# Patient Record
Sex: FEMALE | Race: ASIAN | NOT HISPANIC OR LATINO | Employment: OTHER | URBAN - METROPOLITAN AREA
[De-identification: names, ages, dates, MRNs, and addresses within clinical notes are randomized per-mention and may not be internally consistent; named-entity substitution may affect disease eponyms.]

---

## 2017-01-26 ENCOUNTER — AMBULATORY - HEALTHEAST (OUTPATIENT)
Dept: CARDIOLOGY | Facility: CLINIC | Age: 62
End: 2017-01-26

## 2017-03-20 ENCOUNTER — OFFICE VISIT - HEALTHEAST (OUTPATIENT)
Dept: CARDIOLOGY | Facility: CLINIC | Age: 62
End: 2017-03-20

## 2017-03-20 DIAGNOSIS — I10 ESSENTIAL HYPERTENSION WITH GOAL BLOOD PRESSURE LESS THAN 140/90: ICD-10-CM

## 2017-03-20 DIAGNOSIS — I73.9 CLAUDICATION IN PERIPHERAL VASCULAR DISEASE (H): ICD-10-CM

## 2017-03-20 DIAGNOSIS — E78.5 HYPERLIPIDEMIA LDL GOAL <100: ICD-10-CM

## 2017-03-20 DIAGNOSIS — E11.9 DIABETES (H): ICD-10-CM

## 2017-03-20 DIAGNOSIS — E66.9 OBESITY: ICD-10-CM

## 2017-03-20 LAB
CHOLEST SERPL-MCNC: 177 MG/DL
FASTING STATUS PATIENT QL REPORTED: NO
HDLC SERPL-MCNC: 47 MG/DL
LDLC SERPL CALC-MCNC: 84 MG/DL
TRIGL SERPL-MCNC: 228 MG/DL

## 2017-03-20 ASSESSMENT — MIFFLIN-ST. JEOR: SCORE: 1293.18

## 2017-03-22 ENCOUNTER — HOSPITAL ENCOUNTER (OUTPATIENT)
Dept: ULTRASOUND IMAGING | Facility: HOSPITAL | Age: 62
Discharge: HOME | End: 2017-03-22
Attending: INTERNAL MEDICINE

## 2017-03-22 DIAGNOSIS — I73.9 CLAUDICATION IN PERIPHERAL VASCULAR DISEASE (H): ICD-10-CM

## 2017-10-06 ENCOUNTER — AMBULATORY - HEALTHEAST (OUTPATIENT)
Dept: CARDIOLOGY | Facility: CLINIC | Age: 62
End: 2017-10-06

## 2017-10-06 ENCOUNTER — OFFICE VISIT - HEALTHEAST (OUTPATIENT)
Dept: CARDIOLOGY | Facility: CLINIC | Age: 62
End: 2017-10-06

## 2017-10-06 ENCOUNTER — RECORDS - HEALTHEAST (OUTPATIENT)
Dept: ADMINISTRATIVE | Facility: OTHER | Age: 62
End: 2017-10-06

## 2017-10-06 DIAGNOSIS — I48.0 AF (PAROXYSMAL ATRIAL FIBRILLATION) (H): ICD-10-CM

## 2017-10-06 ASSESSMENT — MIFFLIN-ST. JEOR: SCORE: 1279.81

## 2017-10-09 LAB
ATRIAL RATE - MUSE: 56 BPM
DIASTOLIC BLOOD PRESSURE - MUSE: NORMAL MMHG
INTERPRETATION ECG - MUSE: NORMAL
P AXIS - MUSE: 30 DEGREES
PR INTERVAL - MUSE: 164 MS
QRS DURATION - MUSE: 86 MS
QT - MUSE: 402 MS
QTC - MUSE: 387 MS
R AXIS - MUSE: 23 DEGREES
SYSTOLIC BLOOD PRESSURE - MUSE: NORMAL MMHG
T AXIS - MUSE: 24 DEGREES
VENTRICULAR RATE- MUSE: 56 BPM

## 2017-10-12 ENCOUNTER — HOSPITAL ENCOUNTER (OUTPATIENT)
Dept: CARDIOLOGY | Facility: HOSPITAL | Age: 62
Discharge: HOME | End: 2017-10-12
Attending: INTERNAL MEDICINE

## 2017-10-12 LAB
AORTIC ROOT: 2.8 CM
BSA FOR ECHO PROCEDURE: 1.85 M2
CV BLOOD PRESSURE: NORMAL MMHG
CV ECHO HEIGHT: 59 IN
CV ECHO WEIGHT: 182 LBS
DOP CALC LVOT AREA: 3.14 CM2
DOP CALC LVOT DIAMETER: 2 CM
DOP CALC LVOT STROKE VOLUME: 55.9 CM3
DOP CALCLVOT PEAK VEL VTI: 17.8 CM
EJECTION FRACTION: 64 % (ref 55–75)
FRACTIONAL SHORTENING: 53.1 % (ref 28–44)
INTERVENTRICULAR SEPTUM IN END DIASTOLE: 0.9 CM (ref 0.6–0.9)
IVS/PW RATIO: 0.7
LA AREA 1: 17.5 CM2
LA AREA 2: 11.9 CM2
LEFT ATRIUM LENGTH: 4.89 CM
LEFT ATRIUM SIZE: 4 CM
LEFT ATRIUM TO AORTIC ROOT RATIO: 1.43 NO UNITS
LEFT ATRIUM VOLUME INDEX: 19.6 ML/M2
LEFT ATRIUM VOLUME: 36.2 CM3
LEFT VENTRICLE CARDIAC INDEX: 1.8 L/MIN/M2
LEFT VENTRICLE CARDIAC OUTPUT: 3.4 L/MIN
LEFT VENTRICLE DIASTOLIC VOLUME INDEX: 24.3 CM3/M2 (ref 34–74)
LEFT VENTRICLE DIASTOLIC VOLUME: 45 CM3 (ref 46–106)
LEFT VENTRICLE HEART RATE: 60 BPM
LEFT VENTRICLE MASS INDEX: 108.4 G/M2
LEFT VENTRICLE SYSTOLIC VOLUME INDEX: 8.6 CM3/M2 (ref 11–31)
LEFT VENTRICLE SYSTOLIC VOLUME: 16 CM3 (ref 14–42)
LEFT VENTRICULAR INTERNAL DIMENSION IN DIASTOLE: 4.9 CM (ref 3.8–5.2)
LEFT VENTRICULAR INTERNAL DIMENSION IN SYSTOLE: 2.3 CM (ref 2.2–3.5)
LEFT VENTRICULAR MASS: 200.5 G
LEFT VENTRICULAR OUTFLOW TRACT MEAN GRADIENT: 2 MMHG
LEFT VENTRICULAR OUTFLOW TRACT MEAN VELOCITY: 55.4 CM/S
LEFT VENTRICULAR POSTERIOR WALL IN END DIASTOLE: 1.3 CM (ref 0.6–0.9)
LV STROKE VOLUME INDEX: 30.2 ML/M2
MITRAL VALVE E/A RATIO: 1.4
MV AVERAGE E/E' RATIO: 9 CM/S
MV DECELERATION TIME: 180 MS
MV E'TISSUE VEL-LAT: 8.09 CM/S
MV E'TISSUE VEL-MED: 5.65 CM/S
MV LATERAL E/E' RATIO: 7.7
MV MEDIAL E/E' RATIO: 11
MV PEAK A VELOCITY: 43.3 CM/S
MV PEAK E VELOCITY: 62 CM/S
NUC REST DIASTOLIC VOLUME INDEX: 2912 LBS
NUC REST SYSTOLIC VOLUME INDEX: 59 IN
PR MAX PG: 5 MMHG
PR PEAK VELOCITY: 119 CM/S

## 2017-10-12 ASSESSMENT — MIFFLIN-ST. JEOR: SCORE: 1276.18

## 2017-10-17 ENCOUNTER — COMMUNICATION - HEALTHEAST (OUTPATIENT)
Dept: CARDIOLOGY | Facility: CLINIC | Age: 62
End: 2017-10-17

## 2017-10-19 ENCOUNTER — COMMUNICATION - HEALTHEAST (OUTPATIENT)
Dept: CARDIOLOGY | Facility: CLINIC | Age: 62
End: 2017-10-19

## 2018-05-16 ENCOUNTER — HOSPITAL ENCOUNTER (OUTPATIENT)
Dept: CT IMAGING | Facility: HOSPITAL | Age: 63
Discharge: HOME | End: 2018-05-16
Attending: INTERPRETER

## 2018-05-16 ENCOUNTER — RECORDS - HEALTHEAST (OUTPATIENT)
Dept: ADMINISTRATIVE | Facility: OTHER | Age: 63
End: 2018-05-16

## 2018-05-16 DIAGNOSIS — M54.50 LOW BACK PAIN: ICD-10-CM

## 2018-06-15 ENCOUNTER — COMMUNICATION - HEALTHEAST (OUTPATIENT)
Dept: CARDIOLOGY | Facility: CLINIC | Age: 63
End: 2018-06-15

## 2018-06-15 DIAGNOSIS — I48.0 AF (PAROXYSMAL ATRIAL FIBRILLATION) (H): ICD-10-CM

## 2018-09-27 ENCOUNTER — COMMUNICATION - HEALTHEAST (OUTPATIENT)
Dept: ADMINISTRATIVE | Facility: CLINIC | Age: 63
End: 2018-09-27

## 2018-10-24 ENCOUNTER — COMMUNICATION - HEALTHEAST (OUTPATIENT)
Dept: ADMINISTRATIVE | Facility: CLINIC | Age: 63
End: 2018-10-24

## 2019-01-03 ENCOUNTER — COMMUNICATION - HEALTHEAST (OUTPATIENT)
Dept: CARDIOLOGY | Facility: CLINIC | Age: 64
End: 2019-01-03

## 2019-01-03 DIAGNOSIS — I48.0 AF (PAROXYSMAL ATRIAL FIBRILLATION) (H): ICD-10-CM

## 2019-01-18 ENCOUNTER — OFFICE VISIT (OUTPATIENT)
Dept: OPHTHALMOLOGY | Facility: CLINIC | Age: 64
End: 2019-01-18
Payer: MEDICARE

## 2019-01-18 DIAGNOSIS — H02.826 CYST OF LEFT EYELID: ICD-10-CM

## 2019-01-18 DIAGNOSIS — H44.002 INFECTION OF LEFT EYE: Primary | ICD-10-CM

## 2019-01-18 PROCEDURE — 92002 INTRM OPH EXAM NEW PATIENT: CPT | Performed by: OPHTHALMOLOGY

## 2019-01-18 ASSESSMENT — VISUAL ACUITY
METHOD: SNELLEN - LINEAR
OS_SC: 20/25
OD_SC: 20/25-

## 2019-01-18 NOTE — LETTER
"    1/18/2019         RE: Martha Hubbard  [address] [place] [id number]        Dear Colleague,    Thank you for referring your patient, Martha Hubbard, to the [facility]. Please see a copy of my visit note below.     Current Eye Medications:  AT's As needed, Ketotifen bid o     Subjective:  MD check  Patient was seen 12/24 in ER and was advised to see ophthalmologist for \"eye infection left eye.  She had a hard time getting an appointment that she could make.  Patient is using At's as needed (several times daily) and Ketotifen Bid both eyes.  She complains of a lump on the upper nasal eyelid that is tender to touch. No visual complaints.     Objective:  See Ophthalmology Exam.       Assessment:  Recent preseptal cellulitis likely from infected cyst.  Does not appear to be chalazion or dacryocyst; Sebaceous?      Plan:  Use warm packs on the eyes for about ten minutes twice daily.  Start Maxitrol ointment - place small squirt onto inner corner of the left eye.  Return visit in 2 months for MD check.     Jack Edwards M.D.  [phone]        Again, thank you for allowing me to participate in the care of your patient.        Sincerely,        Jack Edwards MD    "

## 2019-01-18 NOTE — PROGRESS NOTES
" Current Eye Medications:  AT's As needed, Ketotifen bid o     Subjective:  MD check  Patient was seen 12/24 in ER and was advised to see ophthalmologist for \"eye infection left eye.  She had a hard time getting an appointment that she could make.  Patient is using At's as needed (several times daily) and Ketotifen Bid both eyes.  She complains of a lump on the upper nasal eyelid that is tender to touch. No visual complaints.     Objective:  See Ophthalmology Exam.       Assessment:  Recent preseptal cellulitis likely from infected cyst.  Does not appear to be chalazion or dacryocyst; Sebaceous?      Plan:  Use warm packs on the eyes for about ten minutes twice daily.  Start Maxitrol ointment - place small squirt onto inner corner of the left eye.  Return visit in 2 months for MD check.     Jack Edwards M.D.  [phone]      "

## 2019-01-18 NOTE — PATIENT INSTRUCTIONS
Use warm packs on the eyes for about ten minutes twice daily.  Start Maxitrol ointment - place small squirt onto inner corner of the left eye.  Return visit in 2 months for MD check.     Jack Edwards M.D.  [phone]

## 2019-01-19 ASSESSMENT — SLIT LAMP EXAM - LIDS
COMMENTS: NORMAL
COMMENTS: NORMAL

## 2019-01-19 ASSESSMENT — EXTERNAL EXAM - RIGHT EYE: OD_EXAM: NORMAL

## 2019-02-27 ENCOUNTER — AMBULATORY - HEALTHEAST (OUTPATIENT)
Dept: CARDIOLOGY | Facility: CLINIC | Age: 64
End: 2019-02-27

## 2019-02-27 ENCOUNTER — RECORDS - HEALTHEAST (OUTPATIENT)
Dept: ADMINISTRATIVE | Facility: OTHER | Age: 64
End: 2019-02-27

## 2019-05-01 ENCOUNTER — OFFICE VISIT - HEALTHEAST (OUTPATIENT)
Dept: CARDIOLOGY | Facility: CLINIC | Age: 64
End: 2019-05-01

## 2019-05-01 DIAGNOSIS — R00.2 PALPITATIONS: ICD-10-CM

## 2019-05-01 ASSESSMENT — MIFFLIN-ST. JEOR: SCORE: 1289.78

## 2019-05-08 ENCOUNTER — HOSPITAL ENCOUNTER (OUTPATIENT)
Dept: CARDIOLOGY | Facility: HOSPITAL | Age: 64
Discharge: HOME | End: 2019-05-08
Attending: INTERNAL MEDICINE

## 2019-05-08 DIAGNOSIS — R00.2 PALPITATIONS: ICD-10-CM

## 2019-05-24 ENCOUNTER — AMBULATORY - HEALTHEAST (OUTPATIENT)
Dept: CARDIOLOGY | Facility: CLINIC | Age: 64
End: 2019-05-24

## 2019-05-24 ENCOUNTER — COMMUNICATION - HEALTHEAST (OUTPATIENT)
Dept: CARDIOLOGY | Facility: CLINIC | Age: 64
End: 2019-05-24

## 2019-05-28 ENCOUNTER — OFFICE VISIT (OUTPATIENT)
Dept: OPHTHALMOLOGY | Facility: CLINIC | Age: 64
End: 2019-05-28
Payer: MEDICARE

## 2019-05-28 DIAGNOSIS — H44.002 INFECTION OF LEFT EYE: Primary | ICD-10-CM

## 2019-05-28 DIAGNOSIS — H02.826 CYST OF LEFT EYELID: ICD-10-CM

## 2019-05-28 PROCEDURE — 92012 INTRM OPH EXAM EST PATIENT: CPT | Performed by: OPHTHALMOLOGY

## 2019-05-28 ASSESSMENT — SLIT LAMP EXAM - LIDS
COMMENTS: NORMAL
COMMENTS: NORMAL

## 2019-05-28 ASSESSMENT — VISUAL ACUITY
METHOD: SNELLEN - LINEAR
OS_SC+: -2
OD_SC+: -3
OS_SC: 20/20
OD_SC: 20/25

## 2019-05-28 ASSESSMENT — EXTERNAL EXAM - RIGHT EYE: OD_EXAM: NORMAL

## 2019-05-28 NOTE — LETTER
5/28/2019         RE: Martha Hubbard  [address]  [place] [address]        Dear Colleague,    Thank you for referring your patient, Martha Hubbard, to the [facility]. Please see a copy of my visit note below.     Current Eye Medications:  None.       Subjective:  Follow up preseptal cellulitis left eye. Patient is here for a MD Check.  No changes (the same size as her last visit) in the bump near her medial canthus area of her left eye.  She discontinued the ointment, because it did not appear to be helping.  The bump is red, but is not tender to touch.     No itching; just red and somewhat swollen.      Objective:  See Ophthalmology Exam.       Assessment:  Left upper lid cyst improving.      Plan:  Start Tobradex ointment left eye twice daily.  Use artificial tears up to 4 times daily both eyes.  (Refresh Tears, Systane Ultra/Balance, or Theratears)  Use warm packs on the eyes for about 10 minutes twice daily as needed.  Return visit in 2 months for MD check.   (Could get AM opinion)    Jack Edwards M.D.  [phone]         Again, thank you for allowing me to participate in the care of your patient.        Sincerely,        Jack Edwards MD

## 2019-05-28 NOTE — PROGRESS NOTES
Current Eye Medications:  None.       Subjective:  Follow up preseptal cellulitis left eye. Patient is here for a MD Check.  No changes (the same size as her last visit) in the bump near her medial canthus area of her left eye.  She discontinued the ointment, because it did not appear to be helping.  The bump is red, but is not tender to touch.     No itching; just red and somewhat swollen.      Objective:  See Ophthalmology Exam.       Assessment:  Left upper lid cyst improving.      Plan:  Start Tobradex ointment left eye twice daily.  Use artificial tears up to 4 times daily both eyes.  (Refresh Tears, Systane Ultra/Balance, or Theratears)  Use warm packs on the eyes for about 10 minutes twice daily as needed.  Return visit in 2 months for MD check.   (Could get AM opinion)    Jack Edwards M.D.  [phone]

## 2019-05-28 NOTE — PATIENT INSTRUCTIONS
Start Tobradex ointment left eye twice daily.  Use artificial tears up to 4 times daily both eyes.  (Refresh Tears, Systane Ultra/Balance, or Theratears)  Use warm packs on the eyes for about 10 minutes twice daily as needed.  Return visit in 2 months for MD check.     Jack Edwards M.D.  [phone]

## 2019-05-29 ENCOUNTER — TELEPHONE (OUTPATIENT)
Dept: OPHTHALMOLOGY | Facility: CLINIC | Age: 64
End: 2019-05-29

## 2019-05-29 NOTE — TELEPHONE ENCOUNTER
Patient was prescribed Tobradex ointment. Is not covered by patient's insurance.  Sent over Blephamide, but that was not covered either.  Started prior authorization for the tobradex ointment for patient and will wait to hear from [facility] regarding this.

## 2019-05-30 NOTE — TELEPHONE ENCOUNTER
Received fax from [employer] for authorization of the Tobradex ointment, good until 12-31-19.  Called pharmacy and they will fill the Tobradex ointment for the patient.  They have to order the medication and will call her when it comes in.

## 2019-05-31 ENCOUNTER — DOCUMENTATION ONLY (OUTPATIENT)
Dept: OPHTHALMOLOGY | Facility: CLINIC | Age: 64
End: 2019-05-31

## 2020-01-27 ENCOUNTER — AMBULATORY - HEALTHEAST (OUTPATIENT)
Dept: CARDIOLOGY | Facility: CLINIC | Age: 65
End: 2020-01-27

## 2020-01-27 ENCOUNTER — RECORDS - HEALTHEAST (OUTPATIENT)
Dept: ADMINISTRATIVE | Facility: OTHER | Age: 65
End: 2020-01-27

## 2020-03-02 ENCOUNTER — OFFICE VISIT - HEALTHEAST (OUTPATIENT)
Dept: CARDIOLOGY | Facility: CLINIC | Age: 65
End: 2020-03-02

## 2020-03-02 DIAGNOSIS — I10 ESSENTIAL HYPERTENSION: ICD-10-CM

## 2020-03-02 DIAGNOSIS — I48.20 CHRONIC ATRIAL FIBRILLATION (H): ICD-10-CM

## 2020-03-02 DIAGNOSIS — E78.5 HYPERLIPIDEMIA LDL GOAL <100: ICD-10-CM

## 2021-03-22 ENCOUNTER — RECORDS - HEALTHEAST (OUTPATIENT)
Dept: ADMINISTRATIVE | Facility: OTHER | Age: 66
End: 2021-03-22

## 2021-03-24 ENCOUNTER — HOSPITAL ENCOUNTER (OUTPATIENT)
Dept: RADIOLOGY | Facility: HOSPITAL | Age: 66
Discharge: HOME | End: 2021-03-24
Attending: FAMILY MEDICINE

## 2021-03-24 DIAGNOSIS — M71.20 SYNOVIAL CYST OF POPLITEAL SPACE: ICD-10-CM

## 2021-05-05 ENCOUNTER — RECORDS - HEALTHEAST (OUTPATIENT)
Dept: ADMINISTRATIVE | Facility: OTHER | Age: 66
End: 2021-05-05

## 2021-05-05 ENCOUNTER — RECORDS - HEALTHEAST (OUTPATIENT)
Dept: CARDIOLOGY | Facility: CLINIC | Age: 66
End: 2021-05-05

## 2021-05-07 ENCOUNTER — OFFICE VISIT - HEALTHEAST (OUTPATIENT)
Dept: CARDIOLOGY | Facility: CLINIC | Age: 66
End: 2021-05-07

## 2021-05-07 DIAGNOSIS — R06.09 DOE (DYSPNEA ON EXERTION): ICD-10-CM

## 2021-05-07 DIAGNOSIS — E66.01 MORBID OBESITY (H): ICD-10-CM

## 2021-05-07 DIAGNOSIS — R07.9 EXERTIONAL CHEST PAIN: ICD-10-CM

## 2021-05-13 ENCOUNTER — RECORDS - HEALTHEAST (OUTPATIENT)
Dept: ADMINISTRATIVE | Facility: OTHER | Age: 66
End: 2021-05-13

## 2021-05-14 ENCOUNTER — HOSPITAL ENCOUNTER (OUTPATIENT)
Dept: NUCLEAR MEDICINE | Facility: HOSPITAL | Age: 66
Discharge: HOME | End: 2021-05-14
Attending: INTERNAL MEDICINE
Payer: MEDICARE

## 2021-05-14 ENCOUNTER — HOSPITAL ENCOUNTER (OUTPATIENT)
Dept: CARDIOLOGY | Facility: HOSPITAL | Age: 66
Discharge: HOME | End: 2021-05-14
Attending: INTERNAL MEDICINE
Payer: MEDICARE

## 2021-05-14 DIAGNOSIS — R06.09 DOE (DYSPNEA ON EXERTION): ICD-10-CM

## 2021-05-14 DIAGNOSIS — R07.9 EXERTIONAL CHEST PAIN: ICD-10-CM

## 2021-05-14 LAB
CV STRESS CURRENT BP HE: NORMAL
CV STRESS CURRENT HR HE: 107
CV STRESS CURRENT HR HE: 107
CV STRESS CURRENT HR HE: 118
CV STRESS CURRENT HR HE: 119
CV STRESS CURRENT HR HE: 128
CV STRESS CURRENT HR HE: 131
CV STRESS CURRENT HR HE: 131
CV STRESS CURRENT HR HE: 135
CV STRESS CURRENT HR HE: 136
CV STRESS CURRENT HR HE: 136
CV STRESS CURRENT HR HE: 53
CV STRESS CURRENT HR HE: 56
CV STRESS CURRENT HR HE: 67
CV STRESS CURRENT HR HE: 68
CV STRESS CURRENT HR HE: 69
CV STRESS CURRENT HR HE: 69
CV STRESS CURRENT HR HE: 70
CV STRESS CURRENT HR HE: 73
CV STRESS CURRENT HR HE: 74
CV STRESS CURRENT HR HE: 83
CV STRESS CURRENT HR HE: 89
CV STRESS CURRENT HR HE: 91
CV STRESS DEVIATION TIME HE: NORMAL
CV STRESS ECHO PERCENT HR HE: NORMAL
CV STRESS EXERCISE STAGE HE: NORMAL
CV STRESS EXERCISE STAGE REACHED HE: NORMAL
CV STRESS FINAL RESTING BP HE: NORMAL
CV STRESS FINAL RESTING HR HE: 69
CV STRESS MAX HR HE: 136
CV STRESS MAX TREADMILL GRADE HE: 12
CV STRESS MAX TREADMILL SPEED HE: 2.5
CV STRESS PEAK DIA BP HE: NORMAL
CV STRESS PEAK SYS BP HE: NORMAL
CV STRESS PHASE HE: NORMAL
CV STRESS PROTOCOL HE: NORMAL
CV STRESS RESTING PT POSITION HE: NORMAL
CV STRESS RESTING PT POSITION HE: NORMAL
CV STRESS ST DEVIATION AMOUNT HE: NORMAL
CV STRESS ST DEVIATION ELEVATION HE: NORMAL
CV STRESS ST EVELATION AMOUNT HE: NORMAL
CV STRESS TEST TYPE HE: NORMAL
CV STRESS TOTAL STAGE TIME MIN 1 HE: NORMAL
NUC STRESS EJECTION FRACTION: 66 %
RATE PRESSURE PRODUCT: NORMAL
STRESS ECHO BASELINE HR: 53
STRESS ECHO CALCULATED PERCENT HR: 88 %
STRESS ECHO LAST STRESS DIASTOLIC BP: 72
STRESS ECHO LAST STRESS HR: 136
STRESS ECHO LAST STRESS SYSTOLIC BP: 146
STRESS ECHO POST ESTIMATED WORKLOAD: 7.1
STRESS ECHO POST EXERCISE DUR MIN: 5
STRESS ECHO POST EXERCISE DUR SEC: 15
STRESS ECHO TARGET HR: 155

## 2021-05-26 ENCOUNTER — COMMUNICATION - HEALTHEAST (OUTPATIENT)
Dept: CARDIOLOGY | Facility: CLINIC | Age: 66
End: 2021-05-26

## 2021-05-26 DIAGNOSIS — R07.9 EXERTIONAL CHEST PAIN: ICD-10-CM

## 2021-05-26 DIAGNOSIS — R06.09 DOE (DYSPNEA ON EXERTION): ICD-10-CM

## 2021-05-27 VITALS
HEART RATE: 72 BPM | DIASTOLIC BLOOD PRESSURE: 68 MMHG | RESPIRATION RATE: 16 BRPM | SYSTOLIC BLOOD PRESSURE: 110 MMHG | WEIGHT: 191.2 LBS

## 2021-05-28 NOTE — PATIENT INSTRUCTIONS - HE
- Take an extra dose of metoprolol 25 mg when your heart is racing    - Walk for 30 minutes everyday    - Wear a monitor for 24 hours so we can see how your heart rate is doing    - See me in 6 months

## 2021-05-28 NOTE — PROGRESS NOTES
Thank you for asking the [facility] Care team to see Martha Hubbard in consultation  to evaluate atrial fibrillation.      Assessment/Plan:   Paroxysmal atrial fibrillation - rhythm is regular on exam today. She continues on toprol XL 25mg and xarelto. Will set up for a holter monitor to look for rate control.    Obesity - diet and exercise discussed     Hyperlipidemia -  on atorvastatin 20mg. Consider increasing to 40mg on her f/u call.     F/U 6 months     Current History:   Martha Hubbard is a 64 y.o. obese woman with paroxysmal atrial fibrillation, diabetes, hypertension, hyperlipidemia, GERD and obesity. An exercise nuclear stress test was negative for ischemia or infarct 7/2016. ABIs were normal 3/2017.  Her atrial fibrillation was diagnosed 10/2017 and metoprolol and xarelto were started and an echo was essentially normal. 9/2018 she presented in afib with RVR to the ER and no medications changes were made. TSH was normal 2/2019.      Martha returns to clinic today, she was last seen in March 2017, prior to her diagnosis of atrial fibrillation. She notes palpitations a couple of times per year and that they last between 1-6 hours. There is no associated chest discomfort, dyspnea, or dizziness. She denies any syncope. Martha Hubbard does not exercise due to arthralgias. She denies PND or orthopnea or edema.     Past Medical History:     Past Medical History:   Diagnosis Date     Atrial fibrillation (H)      Claudication in peripheral vascular disease (H)      Diabetes mellitus (H)      Hyperlipidemia      Hypertension      Obesity        Past Surgical History:   History reviewed. No pertinent surgical history.    Family History:   History reviewed. No pertinent family history.    Social History:    reports that she has never smoked. She has never used smokeless tobacco. She reports that she does not drink alcohol or use drugs.    Meds:     Current Outpatient Medications   Medication Sig     amLODIPine (NORVASC) 5 MG  "tablet Take 5 mg by mouth daily.     artifi.tears,hypromellose,,PF, 0.3 % Drop Apply 1 drop to eye as needed (Dry Eye).     atorvastatin (LIPITOR) 20 MG tablet Take 20 mg by mouth daily.      cholecalciferol, vitamin D3, (CHOLECALCIFEROL) 1,000 unit tablet Take 2,000 Units by mouth daily.     ibuprofen (ADVIL,MOTRIN) 600 MG tablet Take 600 mg by mouth every 8 (eight) hours as needed for pain.     metoprolol succinate (TOPROL-XL) 25 MG Take 1 tablet (25 mg total) by mouth daily.     nitroglycerin (NITROSTAT) 0.4 MG SL tablet Place 1 tablet (0.4 mg total) under the tongue every 5 (five) minutes as needed for chest pain.     omeprazole (PRILOSEC) 40 MG capsule Take 40 mg by mouth daily before breakfast. Take 30 minutes prior to first meal of the day     XARELTO 20 mg Tab TAKE 1 TABLET(20 MG) BY MOUTH DAILY       Allergies:   Patient has no known allergies.    Review of Systems:   Review of Systems:   General: Weight Gain  Eyes: WNL  Ears/Nose/Throat: WNL  Lungs: Snoring  Heart: Arm Pain  Stomach: WNL  Bladder: Frequent Urination at Night  Muscle/Joints: Joint Pain  Skin: WNL  Nervous System: WNL  Mental Health: Depression     Blood: Easy Bruising       Objective:      Physical Exam  @LASTENCWT:3@  5' 1\" (1.549 m)  @BMI:3@  /80 (Patient Site: Left Arm, Patient Position: Sitting, Cuff Size: Adult Large)   Pulse 64   Resp 16   Ht 5' 1\" (1.549 m)   Wt 178 lb (80.7 kg)   BMI 33.63 kg/m      General Appearance:   Alert, cooperative and in no acute distress.   HEENT:  No scleral icterus; the mucous membranes were pink and moist.   Neck: JVP flat. No thyromegaly. No HJR   Chest: The spine was straight. The chest was symmetric.   Lungs:   Respirations unlabored; the lungs are clear to auscultation.   Cardiovascular:   S1 and S2 normal and without murmur. No clicks or rubs. No carotid bruits noted. Right DP, PT, and radial pulses 2+. Left DP, PT, and radial pulses 2+.   Abdomen:  No organomegaly, masses, bruits, or " tenderness. Bowels sounds are present   Extremities: No cyanosis, clubbing, or edema.   Skin: No xanthelasma.   Neurologic: Mood and affect are appropriate.         Lab Review   Lab Results   Component Value Date     09/16/2018     10/05/2017    K 3.9 09/16/2018    K 3.9 10/05/2017     09/16/2018     10/05/2017    CO2 25 09/16/2018    CO2 24 10/05/2017    BUN 18 09/16/2018    BUN 18 10/05/2017    CREATININE 1.02 09/16/2018    CREATININE 0.90 10/05/2017    CALCIUM 10.0 09/16/2018    CALCIUM 10.2 10/05/2017     Lab Results   Component Value Date    WBC 10.7 09/16/2018    WBC 8.7 10/05/2017    HGB 14.9 09/16/2018    HGB 14.9 10/05/2017    HCT 43.8 09/16/2018    HCT 45.3 10/05/2017    MCV 92 09/16/2018    MCV 94 10/05/2017     09/16/2018     10/05/2017     Lab Results   Component Value Date    CHOL 177 03/20/2017    TRIG 228 (H) 03/20/2017    HDL 47 (L) 03/20/2017     No results found for: BNP      Nia Gonzalez M.D.

## 2021-05-30 VITALS — BODY MASS INDEX: 36.12 KG/M2 | HEIGHT: 60 IN | WEIGHT: 184 LBS

## 2021-05-31 VITALS — BODY MASS INDEX: 36.85 KG/M2 | HEIGHT: 59 IN | WEIGHT: 182.8 LBS

## 2021-05-31 VITALS — BODY MASS INDEX: 36.69 KG/M2 | WEIGHT: 182 LBS | HEIGHT: 59 IN

## 2021-06-02 VITALS — HEIGHT: 61 IN | BODY MASS INDEX: 33.61 KG/M2 | WEIGHT: 178 LBS

## 2021-06-04 VITALS
SYSTOLIC BLOOD PRESSURE: 104 MMHG | HEART RATE: 70 BPM | TEMPERATURE: 98 F | BODY MASS INDEX: 34.39 KG/M2 | WEIGHT: 182 LBS | DIASTOLIC BLOOD PRESSURE: 78 MMHG | OXYGEN SATURATION: 97 % | RESPIRATION RATE: 14 BRPM

## 2021-06-06 NOTE — PATIENT INSTRUCTIONS - HE
- Stay on same medications: Metoprolol is for atrial fibrillation, amlodipine is for blood pressure and xarelto is a blood thinner to reduce stroke     - Walk 30 minutes every day    - See me in 1 year

## 2021-06-09 ENCOUNTER — RECORDS - HEALTHEAST (OUTPATIENT)
Dept: ADMINISTRATIVE | Facility: OTHER | Age: 66
End: 2021-06-09

## 2021-06-09 NOTE — PROGRESS NOTES
Thank you for asking the [facility] Heart Care team to see Martha Hubbard.      Assessment/Plan:     Multiple cardiovascular risk factors. HTN and DM controlled. Will check lipids today.     Regarding her claudication will send for exercise ABIs with duplex and segmental pressures.     Encouraged walking 30 minutes daily x 5 days per week and continuing to cut back on processed carbohydrates.    F/U 1 year       Current History:   Martha Hubbard is a 62 y.o. with diabetes, hypertension, hyperlipidemia, GERD and obesity who I met last summer for episodes of atypical chest tightening for 1 year. An exercise nuclear stress test was negative for ischemia or infarct. She also has occasional strong heart beats but no prolonged palpitations.    Martha returns for f/u today and notes no further chest pains. She had the flu this winter and has not gotten back into her regular exercise regimen of 15 minutes on the treadmill, 3 days per week. She continues to c/o pain in the calves and feet if she walks too fast for too long. She denies rest pain in the legs or feet. The right is worse than the left and also has some right buttock pain with ambulation. The pain in the calves starts in the feet and extends proximally.    She tried to cut down on her rice intake and gained weight. She feels that she is unable to lose weight.        Past Medical History:     Past Medical History:   Diagnosis Date     Claudication in peripheral vascular disease      Diabetes mellitus      Hyperlipidemia      Hypertension      Obesity        Past Surgical History:   History reviewed. No pertinent surgical history.    Family History:   History reviewed. No pertinent family history.    Social History:    reports that she has never smoked. She does not have any smokeless tobacco history on file. She reports that she does not drink alcohol or use illicit drugs.    Meds:     Current Outpatient Prescriptions   Medication Sig Note     amLODIPine (NORVASC) 10 MG tablet  "TK 1 T PO QD 7/27/2016: Received from: External Pharmacy     atorvastatin (LIPITOR) 20 MG tablet Take 20 mg by mouth bedtime.      omeprazole (PRILOSEC) 40 MG capsule TK ONE C PO  30 MINS PRIOR TO FIRST MEAL OF THE DAY 7/27/2016: Received from: External Pharmacy     aspirin 81 MG EC tablet TK 1 T PO QD 7/27/2016: Received from: External Pharmacy     nitroglycerin (NITROSTAT) 0.4 MG SL tablet Place 1 tablet (0.4 mg total) under the tongue every 5 (five) minutes as needed for chest pain.        Allergies:   Review of patient's allergies indicates no known allergies.    Review of Systems:   Review of Systems:   General: WNL  Eyes: WNL  Ears/Nose/Throat: WNL  Lungs: WNL  Heart: WNL  Stomach: WNL  Bladder: WNL  Muscle/Joints: WNL  Skin: WNL  Nervous System: WNL  Mental Health: WNL     Blood: WNL       Objective:      Physical Exam  @LASTENCWT:3@  4' 11.5\" (1.511 m)  @BMI:3@  Visit Vitals     /82 (Patient Site: Right Arm, Patient Position: Sitting, Cuff Size: Adult Large)     Pulse 74     Resp 18     Ht 4' 11.5\" (1.511 m)     Wt 184 lb (83.5 kg)     SpO2 98%     BMI 36.54 kg/m2       General Appearance:   Alert, cooperative and in no acute distress.   HEENT:  No scleral icterus; the mucous membranes were pink and moist.   Neck: JVP flat. No thyromegaly. No HJR   Chest: The spine was straight. The chest was symmetric.   Lungs:   Respirations unlabored; the lungs are clear to auscultation.   Cardiovascular:   S1 and S2 normal and without murmur. No clicks or rubs. No carotid bruits noted. Bilateral radials 2+. Right CFA, pop 2+, pedal pulses 1+. Left CFA 1+, pop 2+, PT 2+, DP 1+. No bruits neck, abdomen, or groin.   Abdomen:  No organomegaly, masses, bruits, or tenderness. Bowels sounds are present   Extremities: No cyanosis, clubbing, or edema.   Skin: No xanthelasma.   Neurologic: Mood and affect are appropriate.         Lab Review   No results found for: NA, K, CL, CO2, BUN, CREATININE, GLUCOSE, CALCIUM  No results " found for: WBC, HGB, HCT, MCV, PLT  No results found for: CHOL, TRIG, HDL, LDL, LDLDIRECT  No results found for: BNP      Nia Gonzalez M.D.

## 2021-06-16 PROBLEM — I48.20 CHRONIC ATRIAL FIBRILLATION (H): Status: ACTIVE | Noted: 2020-03-02

## 2021-06-16 PROBLEM — E66.01 MORBID OBESITY (H): Status: ACTIVE | Noted: 2021-05-07

## 2021-06-22 ENCOUNTER — HOSPITAL ENCOUNTER (OUTPATIENT)
Dept: RADIOLOGY | Facility: HOSPITAL | Age: 66
Discharge: HOME | End: 2021-06-22
Payer: MEDICARE

## 2021-06-22 DIAGNOSIS — M25.559 PAIN IN HIP: ICD-10-CM

## 2021-06-25 NOTE — TELEPHONE ENCOUNTER
----- Message from Nia Gonzalez MD sent at 5/18/2021  3:45 PM CDT -----  Holter does not show any slow or fast heart rates and there is no atrial fibrillation. Let's have her hold her metoprolol and only use it as a pill in the pocket when she is having palpitations. I also want her to go for a 30 minute walk everyday to get herself back in shape. I would like to see her in a month to see if she is feeling better.     Thanks, EG

## 2021-06-25 NOTE — PROGRESS NOTES
Progress Notes by Lexi Self MD at 10/6/2017  3:50 PM     Author: Lexi Self MD Service: -- Author Type: Physician    Filed: 10/9/2017  2:11 PM Encounter Date: 10/6/2017 Status: Signed    : Lexi Self MD (Physician)           Click to link to [facility] HEART CARE NOTE    Thank you, Dr. Garcia, for asking us to see Martha Hubbard at the [facility].      Assessment/Recommendations   Assessment:    1.  Atrial fibrillation: Paroxysmal atrial fibrillation with a recent episode that was prolonged.  She did convert spontaneously.  No associated chest pain or breathing difficulty.  She has been started on metoprolol since yesterday.  We will proceed with a 30 day event monitor to further evaluate her arrhythmia and decide if we need to adjust her medications further.  Check echocardiogram to evaluate for structural heart disease.  2.  Anticoagulation: [id number] = 3 and recommend anticoagulation for stroke risk reduction.  Started her on Xarelto 20 mg daily.  3. Follow-up 1-2 months.       History of Present Illness    Ms. Martha Hubbard is a 62 y.o. female with history of diabetes mellitus type 2, dyslipidemia and hypertension who I am seeing today in rapid access clinic for newly diagnosed atrial fibrillation.  She reports that she has had 5-6 episodes of palpitations over the past 6 months or so.  She had one episode that was prolonged and unremitting yesterday.  She came into the ED was found to be in atrial fibrillation.  She is given medication as slowed and her heart rate and she eventually converted on her own to normal rhythm.  Twelve-lead EKG today shows sinus bradycardia 56 bpm and is otherwise normal.  She denies any problems with chest pain or breathing difficulty.  Last year she underwent an exercise nuclear stress test that was negative for inducible ischemia.  She also had preserved left ventricular systolic function.        Physical Examination Review of Systems   Vitals:    10/06/17 1556   BP: 102/72   Pulse: 72   Resp: 16     Body mass index is 36.92 kg/(m^2).  Wt Readings from Last 3 Encounters:   10/06/17 182 lb 12.8 oz (82.9 kg)   10/05/17 180 lb (81.6 kg)   03/20/17 184 lb (83.5 kg)       General Appearance:   alert, no apparent distress   HEENT:  no scleral icterus; the mucous membranes are pink and moist                                  Neck: jugular venous pressure normal   Chest: the spine is straight and the chest is symmetric   Lungs:   respirations unlabored; the lungs are clear to auscultation   Cardiovascular:   regular rhythm with normal first and second heart sounds and no murmurs or gallops   Abdomen:  no organomegaly, masses, bruits, or tenderness; bowel sounds are present   Extremities: no cyanosis, clubbing, or edema   Skin: no xanthelasma    General: Weight Gain  Eyes: WNL  Ears/Nose/Throat: WNL  Lungs: Snoring  Heart: Irregular Heartbeat  Stomach: Heartburn  Bladder: WNL  Muscle/Joints: Joint Pain  Skin: WNL  Nervous System: WNL  Mental Health: Depression     Blood: WNL     Medical History  Surgical History Family History Social History   Past Medical History:   Diagnosis Date   ? Claudication in peripheral vascular disease    ? Diabetes mellitus    ? Hyperlipidemia    ? Hypertension    ? Obesity     No past surgical history on file.  Social History     Social History   ? Marital status:      Spouse name: N/A   ? Number of children: N/A   ? Years of education: N/A     Occupational History   ? Not on file.     Social History Main Topics   ? Smoking status: Never Smoker   ? Smokeless tobacco: Not on file   ? Alcohol use No   ? Drug use: No   ? Sexual activity: Not on file     Other Topics Concern   ? Not on file     Social History Narrative          Medications  Allergies   Current Outpatient Prescriptions   Medication Sig Dispense Refill   ? amLODIPine (NORVASC) 5 MG tablet Take 5 mg by mouth daily.      ? artifi.tears,hypromellose,,PF, 0.3 % Drop Apply 1 drop to eye as needed (Dry Eye).     ? atorvastatin (LIPITOR) 20 MG tablet Take 20 mg by mouth daily.      ? cholecalciferol, vitamin D3, (CHOLECALCIFEROL) 1,000 unit tablet Take 2,000 Units by mouth daily.     ? ibuprofen (ADVIL,MOTRIN) 600 MG tablet Take 600 mg by mouth every 8 (eight) hours as needed for pain.     ? metoprolol succinate (TOPROL-XL) 25 MG Take 1 tablet (25 mg total) by mouth daily. 20 tablet 0   ? nitroglycerin (NITROSTAT) 0.4 MG SL tablet Place 1 tablet (0.4 mg total) under the tongue every 5 (five) minutes as needed for chest pain. 25 tablet 1   ? omeprazole (PRILOSEC) 40 MG capsule Take 40 mg by mouth daily before breakfast. Take 30 minutes prior to first meal of the day     ? rivaroxaban (XARELTO) 20 mg Tab Take 1 tablet (20 mg total) by mouth daily. 30 tablet 5     No current facility-administered medications for this visit.       No Known Allergies      Lab Results    Chemistry/lipid CBC Cardiac Enzymes/BNP/TSH/INR   Lab Results   Component Value Date    CHOL 177 03/20/2017    HDL 47 (L) 03/20/2017    LDLCALC 84 03/20/2017    TRIG 228 (H) 03/20/2017    CREATININE 0.90 10/05/2017    BUN 18 10/05/2017    K 3.9 10/05/2017     10/05/2017     10/05/2017    CO2 24 10/05/2017    Lab Results   Component Value Date    WBC 8.7 10/05/2017    HGB 14.9 10/05/2017    HCT 45.3 10/05/2017    MCV 94 10/05/2017     10/05/2017    Lab Results   Component Value Date    TROPONINI <0.01 10/05/2017    TSH 1.13 10/05/2017    INR 0.90 10/05/2017

## 2021-06-25 NOTE — TELEPHONE ENCOUNTER
With the help of [facility] , call placed to patient and discussion of results and recommendations. Pt verbalized understanding. Will continue on blood thinner at this time, will stop Metoprolol and take only if needed, once daily for palpitations. Pt will continue to monitor and has follow-up with Cardiology arranged for 7/5/21. Of note patient still has ongoing LOPEZ/SOB, she reports that happens when talking. She finds it hard to talk in a full sentence sometimes. Encouraged to stop the medication as directed, and monitor. If becomes worse to call the clinic. [facility]  gave clinic phone number to call. Keep upcoming OV with EMG in July, call sooner if concerns. Opportunity to ask questions, none further. Pt gave verbal read back to  of heart care clinic phone number. [email or web address]

## 2021-06-28 NOTE — PROGRESS NOTES
Progress Notes by Nia Gonzalez MD at 3/2/2020  4:10 PM     Author: Nia Gonzalez MD Service: -- Author Type: Physician    Filed: 3/2/2020  4:50 PM Encounter Date: 3/2/2020 Status: Signed    : Nia Gonzalez MD (Physician)         Thank you, Dr. Garcia, for asking the [facility] Heart Care team to see Ms. Martha Hubbard to evaluate heart disease.      Assessment/Recommendations   Assessment/Plan:    Atrial fibrillation, paroxysmal - rhythm regular on exam. On metoprolol and xarelto. Asymptomatic.    HTN - controlled    Obesity - diet and exercise    F/U 1 year       History of Present Illness/Subjective    Ms. Martha Hubbard is a 65 y.o. female obese woman with paroxysmal atrial fibrillation, diabetes, hypertension, hyperlipidemia, GERD and obesity. An exercise nuclear stress test was negative for ischemia or infarct 7/2016. ABIs were normal 3/2017.  Her atrial fibrillation was diagnosed 10/2017 and metoprolol and xarelto were started and an echo was essentially normal. 9/2018 she presented in afib with RVR to the ER and no medications changes were made. TSH was normal 2/2019.      Martha Hubbard returns for f/u today. She denies palpitations, dyspnea, chest pain. She was in a local ER about a week ago for fever and cough. No influenza studies were done. She has had no fever for a week and denies a cough, but was heard coughing when walking in the [place]. She denies any sick contacts or recent travel.          Physical Examination Review of Systems   Vitals:    03/02/20 1623   BP: 104/78   Pulse: 70   Resp: 14   SpO2: 97%     Body mass index is 34.39 kg/m .  Wt Readings from Last 3 Encounters:   03/02/20 182 lb (82.6 kg)   05/01/19 178 lb (80.7 kg)   09/16/18 175 lb (79.4 kg)     [unfilled]  General Appearance:   no distress, normal body habitus   ENT/Mouth: membranes moist, no oral lesions or bleeding gums.      EYES:  no scleral icterus, normal conjunctivae   Neck: no carotid bruits or thyromegaly   Chest/Lungs:    lungs are clear to auscultation, no rales or wheezing, no sternal scar, equal chest wall expansion    Cardiovascular:   Regular. Normal first and second heart sounds with no murmurs, rubs, or gallops. The right radial, dorsalis pedis and posterior tibial pulses are intact.  The left radial, dorsalis pedis and posterior tibial pulses are intact. Jugular venous pressure flat, no edema bilaterally    Abdomen:  no organomegaly, masses, bruits, or tenderness; bowel sounds are present   Extremities: no cyanosis or clubbing   Skin: no xanthelasma, warm.    Neurologic: normal  bilateral, no tremors     Psychiatric: alert and oriented x3, calm     General: Fever, Night Sweats  Eyes: WNL  Ears/Nose/Throat: WNL  Lungs: WNL  Heart: WNL  Stomach: Nausea  Bladder: WNL  Muscle/Joints: Joint Pain, Muscle Weakness  Skin: WNL  Nervous System: Loss of Balance  Mental Health: WNL     Blood: WNL     Medical History  Surgical History Family History Social History   Past Medical History:   Diagnosis Date     Atrial fibrillation (H)      Claudication in peripheral vascular disease (H)      Diabetes mellitus (H)      Hyperlipidemia      Hypertension      Obesity     History reviewed. No pertinent surgical history. History reviewed. No pertinent family history. Social History     Socioeconomic History     Marital status:      Spouse name: Not on file     Number of children: Not on file     Years of education: Not on file     Highest education level: Not on file   Occupational History     Not on file   Social Needs     Financial resource strain: Not on file     Food insecurity:     Worry: Not on file     Inability: Not on file     Transportation needs:     Medical: Not on file     Non-medical: Not on file   Tobacco Use     Smoking status: Never Smoker     Smokeless tobacco: Never Used   Substance and Sexual Activity     Alcohol use: No     Drug use: No     Sexual activity: Not on file   Lifestyle     Physical activity:     Days  per week: Not on file     Minutes per session: Not on file     Stress: Not on file   Relationships     Social connections:     Talks on phone: Not on file     Gets together: Not on file     Attends [employer] service: Not on file     Active member of club or organization: Not on file     Attends meetings of clubs or organizations: Not on file     Relationship status: Not on file     Intimate partner violence:     Fear of current or ex partner: Not on file     Emotionally abused: Not on file     Physically abused: Not on file     Forced sexual activity: Not on file   Other Topics Concern     Not on file   Social History Narrative     Not on file          Medications  Allergies   Current Outpatient Medications   Medication Sig Dispense Refill     acetaminophen/chlorpheniramine (CORICIDIN HBP COLD AND FLU ORAL) Take by mouth.       amLODIPine (NORVASC) 5 MG tablet Take 5 mg by mouth daily.       atorvastatin (LIPITOR) 20 MG tablet Take 40 mg by mouth daily.             cetirizine (ZYRTEC) 10 MG tablet TK 1 T PO QD IN THE RINKU       cholecalciferol, vitamin D3, (CHOLECALCIFEROL) 1,000 unit tablet Take 2,000 Units by mouth daily.       metoprolol succinate (TOPROL-XL) 25 MG Take 1 tablet (25 mg total) by mouth daily. 20 tablet 0     omeprazole (PRILOSEC) 40 MG capsule Take 40 mg by mouth daily before breakfast. Take 30 minutes prior to first meal of the day       XARELTO 20 mg Tab TAKE 1 TABLET(20 MG) BY MOUTH DAILY 90 tablet 1     artifi.tears,hypromellose,,PF, 0.3 % Drop Apply 1 drop to eye as needed (Dry Eye).       ibuprofen (ADVIL,MOTRIN) 600 MG tablet Take 600 mg by mouth every 8 (eight) hours as needed for pain.       nitroglycerin (NITROSTAT) 0.4 MG SL tablet Place 1 tablet (0.4 mg total) under the tongue every 5 (five) minutes as needed for chest pain. 25 tablet 1     No current facility-administered medications for this visit.     Allergies   Allergen Reactions     Gabapentin Rash         Lab Results     Chemistry/lipid CBC Cardiac Enzymes/BNP/TSH/INR   Lab Results   Component Value Date    CHOL 177 03/20/2017    HDL 47 (L) 03/20/2017    LDLCALC 84 03/20/2017    TRIG 228 (H) 03/20/2017    CREATININE 1.02 09/16/2018    BUN 18 09/16/2018    K 3.9 09/16/2018     09/16/2018     09/16/2018    CO2 25 09/16/2018    Lab Results   Component Value Date    WBC 10.7 09/16/2018    HGB 14.9 09/16/2018    HCT 43.8 09/16/2018    MCV 92 09/16/2018     09/16/2018    Lab Results   Component Value Date    TROPONINI <0.01 10/05/2017    TSH 1.13 10/05/2017    INR 0.90 10/05/2017

## 2021-06-30 NOTE — PROGRESS NOTES
Progress Notes by Nia Gonzalez MD at 5/7/2021  8:30 AM     Author: Nia Gonzalez MD Service: -- Author Type: Physician    Filed: 5/7/2021  9:14 AM Encounter Date: 5/7/2021 Status: Signed    : Nia Gonzalez MD (Physician)         Thank you, Dr. Garcia, for asking the [facility] Heart Care team to see Ms. Martha Hubbard to evaluate heart disease.      Assessment/Recommendations   Assessment/Plan:    Dyspnea and chest pain with exertion - Exercise nuclear stress test and holter monitor to look for ischemia and afib rates. BNP recently negative.     Atrial fibrillation - normal rate/rhythm on exam. On metoprolol and eliquis. Holter as above.    Obesity - weight loss with diet and exercise encouraged.     F/U pending above results       History of Present Illness/Subjective    Ms. Martha Hubbard is a 66 y.o. female with paroxysmal atrial fibrillation, diabetes, hypertension, hyperlipidemia, GERD and obesity. An exercise nuclear stress test was negative for ischemia or infarct 7/2016. ABIs were normal 3/2017.  Her atrial fibrillation was diagnosed 10/2017 and metoprolol and xarelto were started and an echo was essentially normal. 9/2018 she presented in afib with RVR to the ER and no medications changes were made. TSH was normal 2/2019.      Martha Hubbard returns for f/u today.  She notes that over the past year she has developed dyspnea and chest pressure when carrying something heavy up the stairs. There are no palpitations.  She does note some PND/orthopnea and edema, but no early satiety. Her PCP check BNP, cbc, comp, and TSH at the end of March and they all looked good. Martha notes that she does not exercise and that she has gained 10 lbs this year.         Physical Examination Review of Systems   Vitals:    05/07/21 0847   BP: 110/68   Pulse: 72   Resp: 16     Body mass index is 36.13 kg/m .  Wt Readings from Last 3 Encounters:   05/07/21 191 lb 3.2 oz (86.7 kg)   03/02/20 182 lb (82.6 kg)   05/01/19 178 lb  (80.7 kg)     [unfilled]  General Appearance:   no distress, normal body habitus   ENT/Mouth: membranes moist, no oral lesions or bleeding gums.      EYES:  no scleral icterus, normal conjunctivae   Neck: no carotid bruits or thyromegaly   Chest/Lungs:   lungs are clear to auscultation, no rales or wheezing, no sternal scar, equal chest wall expansion    Cardiovascular:   Regular. Normal first and second heart sounds with no murmurs, rubs, or gallops. The right radial, dorsalis pedis and posterior tibial pulses are intact.  The left radial, dorsalis pedis and posterior tibial pulses are intact. Jugular venous pressure flat, mild edema bilaterally    Abdomen:  no organomegaly, masses, bruits, or tenderness; bowel sounds are present   Extremities: no cyanosis or clubbing   Skin: no xanthelasma, warm.    Neurologic: normal  bilateral, no tremors     Psychiatric: alert and oriented x3, calm     General: WNL  Eyes: WNL  Ears/Nose/Throat: WNL  Lungs: Shortness of Breath  Heart: Shortness of Breath with activity  Stomach: WNL  Bladder: WNL  Muscle/Joints: WNL  Skin: WNL  Nervous System: WNL  Mental Health: WNL     Blood: WNL     Medical History  Surgical History Family History Social History   Past Medical History:   Diagnosis Date     Atrial fibrillation (H)      Claudication in peripheral vascular disease (H)      Diabetes mellitus (H)      Hyperlipidemia      Hypertension      Obesity     History reviewed. No pertinent surgical history. History reviewed. No pertinent family history. Social History     Socioeconomic History     Marital status:      Spouse name: Not on file     Number of children: Not on file     Years of education: Not on file     Highest education level: Not on file   Occupational History     Not on file   Social Needs     Financial resource strain: Not on file     Food insecurity     Worry: Not on file     Inability: Not on file     Transportation needs     Medical: Not on file      Non-medical: Not on file   Tobacco Use     Smoking status: Never Smoker     Smokeless tobacco: Never Used   Substance and Sexual Activity     Alcohol use: No     Drug use: No     Sexual activity: Not on file   Lifestyle     Physical activity     Days per week: Not on file     Minutes per session: Not on file     Stress: Not on file   Relationships     Social connections     Talks on phone: Not on file     Gets together: Not on file     Attends [employer] service: Not on file     Active member of club or organization: Not on file     Attends meetings of clubs or organizations: Not on file     Relationship status: Not on file     Intimate partner violence     Fear of current or ex partner: Not on file     Emotionally abused: Not on file     Physically abused: Not on file     Forced sexual activity: Not on file   Other Topics Concern     Not on file   Social History Narrative     Not on file          Medications  Allergies   Current Outpatient Medications   Medication Sig Dispense Refill     acetaminophen/chlorpheniramine (CORICIDIN HBP COLD AND FLU ORAL) Take by mouth.       amLODIPine (NORVASC) 5 MG tablet Take 5 mg by mouth daily.       artifi.tears,hypromellose,,PF, 0.3 % Drop Apply 1 drop to eye as needed (Dry Eye).       atorvastatin (LIPITOR) 20 MG tablet Take 40 mg by mouth daily.             cetirizine (ZYRTEC) 10 MG tablet TK 1 T PO QD IN THE RINKU       cholecalciferol, vitamin D3, (CHOLECALCIFEROL) 1,000 unit tablet Take 2,000 Units by mouth daily.       ibuprofen (ADVIL,MOTRIN) 600 MG tablet Take 600 mg by mouth every 8 (eight) hours as needed for pain.       metoprolol succinate (TOPROL-XL) 25 MG Take 1 tablet (25 mg total) by mouth daily. 20 tablet 0     nitroglycerin (NITROSTAT) 0.4 MG SL tablet Place 1 tablet (0.4 mg total) under the tongue every 5 (five) minutes as needed for chest pain. 25 tablet 1     omeprazole (PRILOSEC) 40 MG capsule Take 40 mg by mouth daily before breakfast. Take 30 minutes prior  to first meal of the day       XARELTO 20 mg Tab TAKE 1 TABLET(20 MG) BY MOUTH DAILY 90 tablet 1     No current facility-administered medications for this visit.     Allergies   Allergen Reactions     Gabapentin Rash         Lab Results    Chemistry/lipid CBC Cardiac Enzymes/BNP/TSH/INR   Lab Results   Component Value Date    CHOL 177 03/20/2017    HDL 47 (L) 03/20/2017    LDLCALC 84 03/20/2017    TRIG 228 (H) 03/20/2017    CREATININE 1.02 09/16/2018    BUN 18 09/16/2018    K 3.9 09/16/2018     09/16/2018     09/16/2018    CO2 25 09/16/2018    Lab Results   Component Value Date    WBC 10.7 09/16/2018    HGB 14.9 09/16/2018    HCT 43.8 09/16/2018    MCV 92 09/16/2018     09/16/2018    Lab Results   Component Value Date    TROPONINI <0.01 10/05/2017    TSH 1.13 10/05/2017    INR 0.90 10/05/2017

## 2021-07-01 ENCOUNTER — RECORDS - HEALTHEAST (OUTPATIENT)
Dept: ADMINISTRATIVE | Facility: OTHER | Age: 66
End: 2021-07-01

## 2021-07-01 ENCOUNTER — RECORDS - HEALTHEAST (OUTPATIENT)
Dept: CARDIOLOGY | Facility: CLINIC | Age: 66
End: 2021-07-01

## 2021-11-11 ENCOUNTER — HOSPITAL ENCOUNTER (INPATIENT)
Facility: HOSPITAL | Age: 66
Setting detail: SURGERY ADMIT
End: 2021-11-11
Attending: ORTHOPAEDIC SURGERY | Admitting: ORTHOPAEDIC SURGERY
Payer: COMMERCIAL

## 2021-11-27 DIAGNOSIS — Z11.59 ENCOUNTER FOR SCREENING FOR OTHER VIRAL DISEASES: ICD-10-CM

## 2021-12-14 ENCOUNTER — HOSPITAL ENCOUNTER (INPATIENT)
Facility: HOSPITAL | Age: 66
Setting detail: SURGERY ADMIT
End: 2021-12-14
Attending: ORTHOPAEDIC SURGERY | Admitting: ORTHOPAEDIC SURGERY
Payer: COMMERCIAL

## 2021-12-21 ENCOUNTER — MEDICAL CORRESPONDENCE (OUTPATIENT)
Dept: HEALTH INFORMATION MANAGEMENT | Facility: CLINIC | Age: 66
End: 2021-12-21
Payer: MEDICARE

## 2022-02-02 DIAGNOSIS — Z11.59 ENCOUNTER FOR SCREENING FOR OTHER VIRAL DISEASES: Primary | ICD-10-CM

## 2022-02-14 RX ORDER — RIVAROXABAN 20 MG/1
20 TABLET, FILM COATED ORAL DAILY
COMMUNITY
Start: 2021-12-15 | End: 2022-02-14 | Stop reason: HOSPADM

## 2022-02-14 RX ORDER — LORATADINE 10 MG/1
10 TABLET ORAL DAILY
COMMUNITY
Start: 2022-01-13 | End: 2022-02-14 | Stop reason: HOSPADM

## 2022-02-14 RX ORDER — LOSARTAN POTASSIUM 25 MG/1
25 TABLET ORAL DAILY
COMMUNITY
Start: 2022-01-13 | End: 2022-02-14 | Stop reason: HOSPADM

## 2022-02-14 RX ORDER — ACETAMINOPHEN 160 MG
2000 TABLET,DISINTEGRATING ORAL DAILY
COMMUNITY
Start: 2022-01-06 | End: 2022-02-14 | Stop reason: HOSPADM

## 2022-02-14 RX ORDER — CALCIUM CARBONATE/VITAMIN D3 500 MG-10
2 TABLET,CHEWABLE ORAL DAILY
COMMUNITY
Start: 2022-01-13 | End: 2022-02-14 | Stop reason: HOSPADM

## 2022-02-14 RX ORDER — ATORVASTATIN CALCIUM 40 MG/1
40 TABLET, FILM COATED ORAL AT BEDTIME
COMMUNITY
Start: 2022-01-13 | End: 2022-04-04 | Stop reason: HOSPADM

## 2022-02-14 RX ORDER — PSEUDOEPHED/ACETAMINOPH/DIPHEN 30MG-500MG
500 TABLET ORAL EVERY 6 HOURS PRN
COMMUNITY
Start: 2022-01-13 | End: 2022-02-14 | Stop reason: HOSPADM

## 2022-02-14 RX ORDER — POLYVINYL ALCOHOL, POVIDONE 500; 600 MG/100ML; MG/100ML
SOLUTION/ DROPS OPHTHALMIC
COMMUNITY
Start: 2022-01-13 | End: 2022-02-14 | Stop reason: HOSPADM

## 2022-02-14 RX ORDER — OMEPRAZOLE 40 MG/1
40 CAPSULE, DELAYED RELEASE ORAL DAILY
COMMUNITY
Start: 2022-01-03 | End: 2022-02-14 | Stop reason: HOSPADM

## 2022-04-04 RX ORDER — GABAPENTIN 100 MG/1
100 CAPSULE ORAL 3 TIMES DAILY PRN
Status: ON HOLD | COMMUNITY
End: 2022-04-05

## 2022-04-04 RX ORDER — ATORVASTATIN CALCIUM 40 MG/1
40 TABLET, FILM COATED ORAL AT BEDTIME
COMMUNITY

## 2022-04-04 RX ORDER — AMMONIUM LACTATE 12 G/100G
LOTION TOPICAL
Status: ON HOLD | COMMUNITY
End: 2022-04-05

## 2022-04-04 RX ORDER — CARBOXYMETHYLCELLULOSE SODIUM 10 MG/ML
GEL OPHTHALMIC
Status: ON HOLD | COMMUNITY
End: 2022-04-05

## 2022-04-04 RX ORDER — LOSARTAN POTASSIUM 25 MG/1
25 TABLET ORAL DAILY
COMMUNITY
End: 2023-04-17

## 2022-04-04 RX ORDER — ACETAMINOPHEN 500 MG
500 TABLET ORAL EVERY 6 HOURS PRN
Status: ON HOLD | COMMUNITY
End: 2022-04-05

## 2022-04-04 RX ORDER — BETAMETHASONE DIPROPIONATE 0.5 MG/G
CREAM TOPICAL DAILY
Status: ON HOLD | COMMUNITY
End: 2022-04-05

## 2022-04-04 RX ORDER — FLUOCINOLONE ACETONIDE 0.1 MG/ML
SOLUTION TOPICAL
Status: ON HOLD | COMMUNITY
End: 2022-04-05

## 2022-04-04 RX ORDER — OMEPRAZOLE 40 MG/1
40 CAPSULE, DELAYED RELEASE ORAL DAILY
COMMUNITY

## 2022-04-04 RX ORDER — CHOLECALCIFEROL (VITAMIN D3) 50 MCG
50 TABLET ORAL DAILY
COMMUNITY

## 2022-04-04 RX ORDER — CYCLOBENZAPRINE HCL 5 MG
5 TABLET ORAL 3 TIMES DAILY PRN
COMMUNITY
End: 2023-04-17

## 2022-04-04 RX ORDER — LORATADINE 10 MG/1
10 TABLET ORAL DAILY
COMMUNITY
End: 2023-04-17

## 2022-04-04 RX ORDER — TRIAMCINOLONE ACETONIDE 1 MG/G
CREAM TOPICAL 2 TIMES DAILY PRN
Status: ON HOLD | COMMUNITY
End: 2022-04-05

## 2022-04-05 ENCOUNTER — ANESTHESIA EVENT (OUTPATIENT)
Dept: SURGERY | Facility: HOSPITAL | Age: 67
DRG: 455 | End: 2022-04-05
Payer: COMMERCIAL

## 2022-04-05 ENCOUNTER — APPOINTMENT (OUTPATIENT)
Dept: INTERPRETER SERVICES | Facility: CLINIC | Age: 67
End: 2022-04-05
Payer: COMMERCIAL

## 2022-04-05 ENCOUNTER — ANESTHESIA (OUTPATIENT)
Dept: SURGERY | Facility: HOSPITAL | Age: 67
DRG: 455 | End: 2022-04-05
Payer: COMMERCIAL

## 2022-04-05 ENCOUNTER — HOSPITAL ENCOUNTER (INPATIENT)
Facility: HOSPITAL | Age: 67
LOS: 2 days | Discharge: HOME | DRG: 455 | End: 2022-04-07
Attending: ORTHOPAEDIC SURGERY | Admitting: ORTHOPAEDIC SURGERY
Payer: COMMERCIAL

## 2022-04-05 ENCOUNTER — APPOINTMENT (OUTPATIENT)
Dept: RADIOLOGY | Facility: HOSPITAL | Age: 67
DRG: 455 | End: 2022-04-05
Payer: COMMERCIAL

## 2022-04-05 DIAGNOSIS — Z98.1 S/P LUMBAR FUSION: Primary | ICD-10-CM

## 2022-04-05 DIAGNOSIS — I48.20 CHRONIC ATRIAL FIBRILLATION (H): ICD-10-CM

## 2022-04-05 DIAGNOSIS — I10 ESSENTIAL HYPERTENSION: ICD-10-CM

## 2022-04-05 DIAGNOSIS — E78.5 HYPERLIPIDEMIA LDL GOAL <100: ICD-10-CM

## 2022-04-05 DIAGNOSIS — E66.01 MORBID OBESITY (H): ICD-10-CM

## 2022-04-05 LAB
ANION GAP SERPL CALCULATED.3IONS-SCNC: 9 MMOL/L (ref 5–18)
BUN SERPL-MCNC: 21 MG/DL (ref 8–22)
CALCIUM SERPL-MCNC: 9.9 MG/DL (ref 8.5–10.5)
CHLORIDE BLD-SCNC: 105 MMOL/L (ref 98–107)
CO2 SERPL-SCNC: 25 MMOL/L (ref 22–31)
CREAT SERPL-MCNC: 0.88 MG/DL (ref 0.6–1.1)
ERYTHROCYTE [DISTWIDTH] IN BLOOD BY AUTOMATED COUNT: 12.7 % (ref 10–15)
GFR SERPL CREATININE-BSD FRML MDRD: 72 ML/MIN/1.73M2
GLUCOSE BLD-MCNC: 88 MG/DL (ref 70–125)
GLUCOSE BLDC GLUCOMTR-MCNC: 133 MG/DL (ref 70–99)
GLUCOSE BLDC GLUCOMTR-MCNC: 147 MG/DL (ref 70–99)
GLUCOSE BLDC GLUCOMTR-MCNC: 158 MG/DL (ref 70–99)
GLUCOSE BLDC GLUCOMTR-MCNC: 159 MG/DL (ref 70–99)
GLUCOSE BLDC GLUCOMTR-MCNC: 83 MG/DL (ref 70–99)
HBA1C MFR BLD: 6 %
HCT VFR BLD AUTO: 40.5 % (ref 35–47)
HGB BLD-MCNC: 13.3 G/DL (ref 11.7–15.7)
MCH RBC QN AUTO: 30.6 PG (ref 26.5–33)
MCHC RBC AUTO-ENTMCNC: 32.8 G/DL (ref 31.5–36.5)
MCV RBC AUTO: 93 FL (ref 78–100)
PLATELET # BLD AUTO: 276 10E3/UL (ref 150–450)
POTASSIUM BLD-SCNC: 4 MMOL/L (ref 3.5–5)
RBC # BLD AUTO: 4.35 10E6/UL (ref 3.8–5.2)
SODIUM SERPL-SCNC: 139 MMOL/L (ref 136–145)
WBC # BLD AUTO: 7.9 10E3/UL (ref 4–11)

## 2022-04-05 PROCEDURE — 250N000009 HC RX 250: Performed by: ORTHOPAEDIC SURGERY

## 2022-04-05 PROCEDURE — 370N000017 HC ANESTHESIA TECHNICAL FEE, PER MIN: Performed by: ORTHOPAEDIC SURGERY

## 2022-04-05 PROCEDURE — 258N000001 HC RX 258

## 2022-04-05 PROCEDURE — 0ST20ZZ RESECTION OF LUMBAR VERTEBRAL DISC, OPEN APPROACH: ICD-10-PCS | Performed by: ORTHOPAEDIC SURGERY

## 2022-04-05 PROCEDURE — 250N000013 HC RX MED GY IP 250 OP 250 PS 637

## 2022-04-05 PROCEDURE — 250N000013 HC RX MED GY IP 250 OP 250 PS 637: Performed by: ANESTHESIOLOGY

## 2022-04-05 PROCEDURE — 250N000009 HC RX 250: Performed by: STUDENT IN AN ORGANIZED HEALTH CARE EDUCATION/TRAINING PROGRAM

## 2022-04-05 PROCEDURE — 250N000012 HC RX MED GY IP 250 OP 636 PS 637: Performed by: INTERNAL MEDICINE

## 2022-04-05 PROCEDURE — 278N000051 HC OR IMPLANT GENERAL: Performed by: ORTHOPAEDIC SURGERY

## 2022-04-05 PROCEDURE — 250N000011 HC RX IP 250 OP 636

## 2022-04-05 PROCEDURE — 120N000001 HC R&B MED SURG/OB

## 2022-04-05 PROCEDURE — 250N000011 HC RX IP 250 OP 636: Performed by: ORTHOPAEDIC SURGERY

## 2022-04-05 PROCEDURE — C1713 ANCHOR/SCREW BN/BN,TIS/BN: HCPCS | Performed by: ORTHOPAEDIC SURGERY

## 2022-04-05 PROCEDURE — 250N000013 HC RX MED GY IP 250 OP 250 PS 637: Performed by: ORTHOPAEDIC SURGERY

## 2022-04-05 PROCEDURE — 250N000011 HC RX IP 250 OP 636: Performed by: STUDENT IN AN ORGANIZED HEALTH CARE EDUCATION/TRAINING PROGRAM

## 2022-04-05 PROCEDURE — C1762 CONN TISS, HUMAN(INC FASCIA): HCPCS | Performed by: ORTHOPAEDIC SURGERY

## 2022-04-05 PROCEDURE — 258N000003 HC RX IP 258 OP 636

## 2022-04-05 PROCEDURE — 710N000009 HC RECOVERY PHASE 1, LEVEL 1, PER MIN: Performed by: ORTHOPAEDIC SURGERY

## 2022-04-05 PROCEDURE — 82310 ASSAY OF CALCIUM: CPT | Performed by: ANESTHESIOLOGY

## 2022-04-05 PROCEDURE — 999N000181 XR SURGERY CARM FLUORO GREATER THAN 5 MIN W STILLS

## 2022-04-05 PROCEDURE — 258N000003 HC RX IP 258 OP 636: Performed by: STUDENT IN AN ORGANIZED HEALTH CARE EDUCATION/TRAINING PROGRAM

## 2022-04-05 PROCEDURE — 250N000011 HC RX IP 250 OP 636: Performed by: ANESTHESIOLOGY

## 2022-04-05 PROCEDURE — 258N000003 HC RX IP 258 OP 636: Performed by: ANESTHESIOLOGY

## 2022-04-05 PROCEDURE — 01NB0ZZ RELEASE LUMBAR NERVE, OPEN APPROACH: ICD-10-PCS | Performed by: ORTHOPAEDIC SURGERY

## 2022-04-05 PROCEDURE — 0SG0071 FUSION OF LUMBAR VERTEBRAL JOINT WITH AUTOLOGOUS TISSUE SUBSTITUTE, POSTERIOR APPROACH, POSTERIOR COLUMN, OPEN APPROACH: ICD-10-PCS | Performed by: ORTHOPAEDIC SURGERY

## 2022-04-05 PROCEDURE — 272N000001 HC OR GENERAL SUPPLY STERILE: Performed by: ORTHOPAEDIC SURGERY

## 2022-04-05 PROCEDURE — 999N000141 HC STATISTIC PRE-PROCEDURE NURSING ASSESSMENT: Performed by: ORTHOPAEDIC SURGERY

## 2022-04-05 PROCEDURE — 85018 HEMOGLOBIN: CPT | Performed by: ANESTHESIOLOGY

## 2022-04-05 PROCEDURE — 360N000085 HC SURGERY LEVEL 5 W/ FLUORO, PER MIN: Performed by: ORTHOPAEDIC SURGERY

## 2022-04-05 PROCEDURE — 250N000025 HC SEVOFLURANE, PER MIN: Performed by: ORTHOPAEDIC SURGERY

## 2022-04-05 PROCEDURE — 36415 COLL VENOUS BLD VENIPUNCTURE: CPT | Performed by: ANESTHESIOLOGY

## 2022-04-05 PROCEDURE — 83036 HEMOGLOBIN GLYCOSYLATED A1C: CPT | Performed by: INTERNAL MEDICINE

## 2022-04-05 PROCEDURE — 0SG00AJ FUSION OF LUMBAR VERTEBRAL JOINT WITH INTERBODY FUSION DEVICE, POSTERIOR APPROACH, ANTERIOR COLUMN, OPEN APPROACH: ICD-10-PCS | Performed by: ORTHOPAEDIC SURGERY

## 2022-04-05 PROCEDURE — 99232 SBSQ HOSP IP/OBS MODERATE 35: CPT | Performed by: INTERNAL MEDICINE

## 2022-04-05 DEVICE — IMP SCR MEDT 4.75MM SOLERA 6.5X45MM MA 54840006545: Type: IMPLANTABLE DEVICE | Site: SPINE LUMBAR | Status: FUNCTIONAL

## 2022-04-05 DEVICE — IMPLANTABLE DEVICE: Type: IMPLANTABLE DEVICE | Site: SPINE LUMBAR | Status: FUNCTIONAL

## 2022-04-05 DEVICE — IMP ROD MEDT 3.5CM 1475501035: Type: IMPLANTABLE DEVICE | Site: SPINE LUMBAR | Status: FUNCTIONAL

## 2022-04-05 DEVICE — GRAFT BONE MAGNIFUSE 7.5MMX25MM 7509007: Type: IMPLANTABLE DEVICE | Site: SPINE LUMBAR | Status: FUNCTIONAL

## 2022-04-05 DEVICE — IMP SCR MEDT BREAK-OFF SET SOLERA 4.75MM TI 5440030: Type: IMPLANTABLE DEVICE | Site: SPINE LUMBAR | Status: FUNCTIONAL

## 2022-04-05 DEVICE — IMP SPI INTERBODY MEDT CATALYFT PL SHORT 9MM 6068093: Type: IMPLANTABLE DEVICE | Site: SPINE LUMBAR | Status: FUNCTIONAL

## 2022-04-05 DEVICE — IMP SCR MEDT 4.75MM SOLERA 6.5X55MM MA 54840006555: Type: IMPLANTABLE DEVICE | Site: SPINE LUMBAR | Status: FUNCTIONAL

## 2022-04-05 RX ORDER — HYDROMORPHONE HYDROCHLORIDE 1 MG/ML
0.2 INJECTION, SOLUTION INTRAMUSCULAR; INTRAVENOUS; SUBCUTANEOUS
Status: DISCONTINUED | OUTPATIENT
Start: 2022-04-05 | End: 2022-04-07 | Stop reason: HOSPADM

## 2022-04-05 RX ORDER — MEPERIDINE HYDROCHLORIDE 25 MG/ML
12.5 INJECTION INTRAMUSCULAR; INTRAVENOUS; SUBCUTANEOUS EVERY 5 MIN PRN
Status: DISCONTINUED | OUTPATIENT
Start: 2022-04-05 | End: 2022-04-05 | Stop reason: HOSPADM

## 2022-04-05 RX ORDER — LORATADINE 10 MG/1
10 TABLET ORAL DAILY PRN
Status: DISCONTINUED | OUTPATIENT
Start: 2022-04-05 | End: 2022-04-07 | Stop reason: HOSPADM

## 2022-04-05 RX ORDER — POLYVINYL ALCOHOL 14 MG/ML
1 SOLUTION/ DROPS OPHTHALMIC PRN
COMMUNITY

## 2022-04-05 RX ORDER — GABAPENTIN 100 MG/1
100 CAPSULE ORAL
Status: DISCONTINUED | OUTPATIENT
Start: 2022-04-05 | End: 2022-04-05

## 2022-04-05 RX ORDER — NALOXONE HYDROCHLORIDE 1 MG/ML
0.4 INJECTION INTRAMUSCULAR; INTRAVENOUS; SUBCUTANEOUS
Status: DISCONTINUED | OUTPATIENT
Start: 2022-04-05 | End: 2022-04-07 | Stop reason: HOSPADM

## 2022-04-05 RX ORDER — DEXTROSE MONOHYDRATE 25 G/50ML
25-50 INJECTION, SOLUTION INTRAVENOUS
Status: DISCONTINUED | OUTPATIENT
Start: 2022-04-05 | End: 2022-04-07 | Stop reason: HOSPADM

## 2022-04-05 RX ORDER — HALOPERIDOL 5 MG/ML
1 INJECTION INTRAMUSCULAR
Status: DISCONTINUED | OUTPATIENT
Start: 2022-04-05 | End: 2022-04-05 | Stop reason: HOSPADM

## 2022-04-05 RX ORDER — AMOXICILLIN 250 MG
1 CAPSULE ORAL 2 TIMES DAILY
Status: DISCONTINUED | OUTPATIENT
Start: 2022-04-05 | End: 2022-04-07 | Stop reason: HOSPADM

## 2022-04-05 RX ORDER — DEXAMETHASONE SODIUM PHOSPHATE 10 MG/ML
10 INJECTION, SOLUTION INTRAMUSCULAR; INTRAVENOUS ONCE
Status: COMPLETED | OUTPATIENT
Start: 2022-04-05 | End: 2022-04-05

## 2022-04-05 RX ORDER — NALOXONE HYDROCHLORIDE 1 MG/ML
0.2 INJECTION INTRAMUSCULAR; INTRAVENOUS; SUBCUTANEOUS
Status: DISCONTINUED | OUTPATIENT
Start: 2022-04-05 | End: 2022-04-07 | Stop reason: HOSPADM

## 2022-04-05 RX ORDER — ONDANSETRON 4 MG/1
4 TABLET, ORALLY DISINTEGRATING ORAL EVERY 30 MIN PRN
Status: DISCONTINUED | OUTPATIENT
Start: 2022-04-05 | End: 2022-04-05 | Stop reason: HOSPADM

## 2022-04-05 RX ORDER — POLYVINYL ALCOHOL 14 MG/ML
1 SOLUTION/ DROPS OPHTHALMIC DAILY PRN
Status: DISCONTINUED | OUTPATIENT
Start: 2022-04-05 | End: 2022-04-07 | Stop reason: HOSPADM

## 2022-04-05 RX ORDER — CEFAZOLIN SODIUM/WATER 2 G/20 ML
2 SYRINGE (ML) INTRAVENOUS SEE ADMIN INSTRUCTIONS
Status: DISCONTINUED | OUTPATIENT
Start: 2022-04-05 | End: 2022-04-05 | Stop reason: HOSPADM

## 2022-04-05 RX ORDER — ACETAMINOPHEN 325 MG/1
650 TABLET ORAL EVERY 4 HOURS PRN
Status: DISCONTINUED | OUTPATIENT
Start: 2022-04-08 | End: 2022-04-07 | Stop reason: HOSPADM

## 2022-04-05 RX ORDER — POLYETHYLENE GLYCOL 3350 17 G/17G
17 POWDER, FOR SOLUTION ORAL DAILY
Status: DISCONTINUED | OUTPATIENT
Start: 2022-04-06 | End: 2022-04-07 | Stop reason: HOSPADM

## 2022-04-05 RX ORDER — BISACODYL 10 MG
10 SUPPOSITORY, RECTAL RECTAL DAILY PRN
Status: DISCONTINUED | OUTPATIENT
Start: 2022-04-05 | End: 2022-04-07 | Stop reason: HOSPADM

## 2022-04-05 RX ORDER — KETAMINE HYDROCHLORIDE 10 MG/ML
INJECTION INTRAMUSCULAR; INTRAVENOUS PRN
Status: DISCONTINUED | OUTPATIENT
Start: 2022-04-05 | End: 2022-04-05

## 2022-04-05 RX ORDER — MAGNESIUM SULFATE 4 G/50ML
4 INJECTION INTRAVENOUS ONCE
Status: COMPLETED | OUTPATIENT
Start: 2022-04-05 | End: 2022-04-05

## 2022-04-05 RX ORDER — LORATADINE 10 MG/1
10 TABLET ORAL DAILY
Status: DISCONTINUED | OUTPATIENT
Start: 2022-04-06 | End: 2022-04-07 | Stop reason: HOSPADM

## 2022-04-05 RX ORDER — OXYCODONE HYDROCHLORIDE 5 MG/1
5 TABLET ORAL EVERY 4 HOURS PRN
Status: DISCONTINUED | OUTPATIENT
Start: 2022-04-05 | End: 2022-04-07 | Stop reason: HOSPADM

## 2022-04-05 RX ORDER — ONDANSETRON 2 MG/ML
4 INJECTION INTRAMUSCULAR; INTRAVENOUS EVERY 6 HOURS PRN
Status: DISCONTINUED | OUTPATIENT
Start: 2022-04-05 | End: 2022-04-07 | Stop reason: HOSPADM

## 2022-04-05 RX ORDER — VANCOMYCIN HYDROCHLORIDE 1 G/20ML
INJECTION, POWDER, LYOPHILIZED, FOR SOLUTION INTRAVENOUS PRN
Status: DISCONTINUED | OUTPATIENT
Start: 2022-04-05 | End: 2022-04-05 | Stop reason: HOSPADM

## 2022-04-05 RX ORDER — HYDROMORPHONE HYDROCHLORIDE 1 MG/ML
0.4 INJECTION, SOLUTION INTRAMUSCULAR; INTRAVENOUS; SUBCUTANEOUS
Status: DISCONTINUED | OUTPATIENT
Start: 2022-04-05 | End: 2022-04-07 | Stop reason: HOSPADM

## 2022-04-05 RX ORDER — GABAPENTIN 100 MG/1
100 CAPSULE ORAL 3 TIMES DAILY
Status: DISCONTINUED | OUTPATIENT
Start: 2022-04-05 | End: 2022-04-05

## 2022-04-05 RX ORDER — ONDANSETRON 4 MG/1
4 TABLET, ORALLY DISINTEGRATING ORAL EVERY 6 HOURS PRN
Status: DISCONTINUED | OUTPATIENT
Start: 2022-04-05 | End: 2022-04-07 | Stop reason: HOSPADM

## 2022-04-05 RX ORDER — CEFAZOLIN SODIUM/WATER 2 G/20 ML
2 SYRINGE (ML) INTRAVENOUS EVERY 8 HOURS
Status: DISCONTINUED | OUTPATIENT
Start: 2022-04-05 | End: 2022-04-05 | Stop reason: RX

## 2022-04-05 RX ORDER — LIDOCAINE HYDROCHLORIDE AND EPINEPHRINE 10; 10 MG/ML; UG/ML
INJECTION, SOLUTION INFILTRATION; PERINEURAL PRN
Status: DISCONTINUED | OUTPATIENT
Start: 2022-04-05 | End: 2022-04-05 | Stop reason: HOSPADM

## 2022-04-05 RX ORDER — FENTANYL CITRATE 50 UG/ML
50 INJECTION, SOLUTION INTRAMUSCULAR; INTRAVENOUS EVERY 5 MIN PRN
Status: DISCONTINUED | OUTPATIENT
Start: 2022-04-05 | End: 2022-04-05 | Stop reason: HOSPADM

## 2022-04-05 RX ORDER — PROPOFOL 10 MG/ML
INJECTION, EMULSION INTRAVENOUS CONTINUOUS PRN
Status: DISCONTINUED | OUTPATIENT
Start: 2022-04-05 | End: 2022-04-05

## 2022-04-05 RX ORDER — TRANEXAMIC ACID 650 MG/1
650 TABLET ORAL ONCE
Status: COMPLETED | OUTPATIENT
Start: 2022-04-05 | End: 2022-04-05

## 2022-04-05 RX ORDER — PROCHLORPERAZINE MALEATE 5 MG
5 TABLET ORAL EVERY 6 HOURS PRN
Status: DISCONTINUED | OUTPATIENT
Start: 2022-04-05 | End: 2022-04-07 | Stop reason: HOSPADM

## 2022-04-05 RX ORDER — ALBUTEROL SULFATE 0.83 MG/ML
2.5 SOLUTION RESPIRATORY (INHALATION) EVERY 4 HOURS PRN
Status: DISCONTINUED | OUTPATIENT
Start: 2022-04-05 | End: 2022-04-05 | Stop reason: HOSPADM

## 2022-04-05 RX ORDER — OXYCODONE HYDROCHLORIDE 5 MG/1
10 TABLET ORAL EVERY 4 HOURS PRN
Status: DISCONTINUED | OUTPATIENT
Start: 2022-04-05 | End: 2022-04-07 | Stop reason: HOSPADM

## 2022-04-05 RX ORDER — SODIUM CHLORIDE 9 MG/ML
INJECTION, SOLUTION INTRAVENOUS CONTINUOUS
Status: DISCONTINUED | OUTPATIENT
Start: 2022-04-05 | End: 2022-04-06

## 2022-04-05 RX ORDER — PROPOFOL 10 MG/ML
INJECTION, EMULSION INTRAVENOUS PRN
Status: DISCONTINUED | OUTPATIENT
Start: 2022-04-05 | End: 2022-04-05

## 2022-04-05 RX ORDER — PANTOPRAZOLE SODIUM 40 MG/1
40 TABLET, DELAYED RELEASE ORAL
Status: DISCONTINUED | OUTPATIENT
Start: 2022-04-05 | End: 2022-04-07 | Stop reason: HOSPADM

## 2022-04-05 RX ORDER — NICOTINE POLACRILEX 4 MG
15-30 LOZENGE BUCCAL
Status: DISCONTINUED | OUTPATIENT
Start: 2022-04-05 | End: 2022-04-07 | Stop reason: HOSPADM

## 2022-04-05 RX ORDER — SODIUM CHLORIDE, SODIUM LACTATE, POTASSIUM CHLORIDE, CALCIUM CHLORIDE 600; 310; 30; 20 MG/100ML; MG/100ML; MG/100ML; MG/100ML
INJECTION, SOLUTION INTRAVENOUS CONTINUOUS
Status: DISCONTINUED | OUTPATIENT
Start: 2022-04-05 | End: 2022-04-05 | Stop reason: HOSPADM

## 2022-04-05 RX ORDER — FENTANYL CITRATE 50 UG/ML
INJECTION, SOLUTION INTRAMUSCULAR; INTRAVENOUS PRN
Status: DISCONTINUED | OUTPATIENT
Start: 2022-04-05 | End: 2022-04-05

## 2022-04-05 RX ORDER — ACETAMINOPHEN 325 MG/1
975 TABLET ORAL ONCE
Status: COMPLETED | OUTPATIENT
Start: 2022-04-05 | End: 2022-04-05

## 2022-04-05 RX ORDER — GLYCOPYRROLATE 0.2 MG/ML
INJECTION, SOLUTION INTRAMUSCULAR; INTRAVENOUS PRN
Status: DISCONTINUED | OUTPATIENT
Start: 2022-04-05 | End: 2022-04-05

## 2022-04-05 RX ORDER — ONDANSETRON 2 MG/ML
INJECTION INTRAMUSCULAR; INTRAVENOUS PRN
Status: DISCONTINUED | OUTPATIENT
Start: 2022-04-05 | End: 2022-04-05

## 2022-04-05 RX ORDER — MAGNESIUM HYDROXIDE 1200 MG/15ML
LIQUID ORAL PRN
Status: DISCONTINUED | OUTPATIENT
Start: 2022-04-05 | End: 2022-04-05 | Stop reason: HOSPADM

## 2022-04-05 RX ORDER — LABETALOL HYDROCHLORIDE 5 MG/ML
10 INJECTION, SOLUTION INTRAVENOUS
Status: DISCONTINUED | OUTPATIENT
Start: 2022-04-05 | End: 2022-04-05 | Stop reason: HOSPADM

## 2022-04-05 RX ORDER — CYCLOBENZAPRINE HCL 5 MG
5 TABLET ORAL 3 TIMES DAILY PRN
Status: DISCONTINUED | OUTPATIENT
Start: 2022-04-05 | End: 2022-04-05

## 2022-04-05 RX ORDER — ONDANSETRON 2 MG/ML
4 INJECTION INTRAMUSCULAR; INTRAVENOUS EVERY 30 MIN PRN
Status: DISCONTINUED | OUTPATIENT
Start: 2022-04-05 | End: 2022-04-05 | Stop reason: HOSPADM

## 2022-04-05 RX ORDER — OXYCODONE HYDROCHLORIDE 5 MG/1
5 TABLET ORAL EVERY 4 HOURS PRN
Status: DISCONTINUED | OUTPATIENT
Start: 2022-04-05 | End: 2022-04-05 | Stop reason: HOSPADM

## 2022-04-05 RX ORDER — CEFAZOLIN SODIUM 2 G/100ML
2 INJECTION, SOLUTION INTRAVENOUS EVERY 8 HOURS
Status: COMPLETED | OUTPATIENT
Start: 2022-04-05 | End: 2022-04-06

## 2022-04-05 RX ORDER — CEFAZOLIN SODIUM/WATER 2 G/20 ML
2 SYRINGE (ML) INTRAVENOUS
Status: COMPLETED | OUTPATIENT
Start: 2022-04-05 | End: 2022-04-05

## 2022-04-05 RX ORDER — METHOCARBAMOL 500 MG/1
500 TABLET, FILM COATED ORAL EVERY 6 HOURS PRN
Status: DISCONTINUED | OUTPATIENT
Start: 2022-04-05 | End: 2022-04-07 | Stop reason: HOSPADM

## 2022-04-05 RX ORDER — LIDOCAINE HYDROCHLORIDE 20 MG/ML
INJECTION, SOLUTION INFILTRATION; PERINEURAL PRN
Status: DISCONTINUED | OUTPATIENT
Start: 2022-04-05 | End: 2022-04-05

## 2022-04-05 RX ORDER — ACETAMINOPHEN 325 MG/1
975 TABLET ORAL EVERY 8 HOURS
Status: DISCONTINUED | OUTPATIENT
Start: 2022-04-05 | End: 2022-04-07 | Stop reason: HOSPADM

## 2022-04-05 RX ORDER — LIDOCAINE 40 MG/G
CREAM TOPICAL
Status: DISCONTINUED | OUTPATIENT
Start: 2022-04-05 | End: 2022-04-05 | Stop reason: HOSPADM

## 2022-04-05 RX ORDER — DEXAMETHASONE SODIUM PHOSPHATE 4 MG/ML
INJECTION, SOLUTION INTRA-ARTICULAR; INTRALESIONAL; INTRAMUSCULAR; INTRAVENOUS; SOFT TISSUE PRN
Status: DISCONTINUED | OUTPATIENT
Start: 2022-04-05 | End: 2022-04-05

## 2022-04-05 RX ORDER — HYDROMORPHONE HYDROCHLORIDE 1 MG/ML
0.4 INJECTION, SOLUTION INTRAMUSCULAR; INTRAVENOUS; SUBCUTANEOUS EVERY 5 MIN PRN
Status: DISCONTINUED | OUTPATIENT
Start: 2022-04-05 | End: 2022-04-05 | Stop reason: HOSPADM

## 2022-04-05 RX ORDER — MULTIVITAMIN,THERAPEUTIC
1 TABLET ORAL DAILY
Status: DISCONTINUED | OUTPATIENT
Start: 2022-04-06 | End: 2022-04-07 | Stop reason: HOSPADM

## 2022-04-05 RX ADMIN — DEXAMETHASONE SODIUM PHOSPHATE 10 MG: 10 INJECTION, SOLUTION INTRAMUSCULAR; INTRAVENOUS at 11:28

## 2022-04-05 RX ADMIN — GLYCOPYRROLATE 0.2 MG: 0.2 INJECTION, SOLUTION INTRAMUSCULAR; INTRAVENOUS at 11:18

## 2022-04-05 RX ADMIN — INSULIN ASPART 1 UNITS: 100 INJECTION, SOLUTION INTRAVENOUS; SUBCUTANEOUS at 19:13

## 2022-04-05 RX ADMIN — MAGNESIUM SULFATE HEPTAHYDRATE 4 G: 80 INJECTION, SOLUTION INTRAVENOUS at 10:34

## 2022-04-05 RX ADMIN — PHENYLEPHRINE HYDROCHLORIDE 0.2 MCG/KG/MIN: 10 INJECTION INTRAVENOUS at 12:20

## 2022-04-05 RX ADMIN — FENTANYL CITRATE 50 MCG: 50 INJECTION, SOLUTION INTRAMUSCULAR; INTRAVENOUS at 12:39

## 2022-04-05 RX ADMIN — PHENYLEPHRINE HYDROCHLORIDE 100 MCG: 10 INJECTION INTRAVENOUS at 11:19

## 2022-04-05 RX ADMIN — SODIUM CHLORIDE, POTASSIUM CHLORIDE, SODIUM LACTATE AND CALCIUM CHLORIDE: 600; 310; 30; 20 INJECTION, SOLUTION INTRAVENOUS at 10:15

## 2022-04-05 RX ADMIN — LIDOCAINE HYDROCHLORIDE 40 MG: 20 INJECTION, SOLUTION INFILTRATION; PERINEURAL at 11:17

## 2022-04-05 RX ADMIN — ONDANSETRON 4 MG: 2 INJECTION INTRAMUSCULAR; INTRAVENOUS at 14:59

## 2022-04-05 RX ADMIN — Medication 2 G: at 11:10

## 2022-04-05 RX ADMIN — ROCURONIUM BROMIDE 30 MG: 50 INJECTION, SOLUTION INTRAVENOUS at 12:18

## 2022-04-05 RX ADMIN — MIDAZOLAM 1 MG: 1 INJECTION INTRAMUSCULAR; INTRAVENOUS at 11:10

## 2022-04-05 RX ADMIN — PROPOFOL 25 MCG/KG/MIN: 10 INJECTION, EMULSION INTRAVENOUS at 11:23

## 2022-04-05 RX ADMIN — PANTOPRAZOLE SODIUM 40 MG: 40 TABLET, DELAYED RELEASE ORAL at 18:44

## 2022-04-05 RX ADMIN — PHENYLEPHRINE HYDROCHLORIDE 50 MCG: 10 INJECTION INTRAVENOUS at 15:22

## 2022-04-05 RX ADMIN — SODIUM CHLORIDE: 9 INJECTION, SOLUTION INTRAVENOUS at 18:40

## 2022-04-05 RX ADMIN — CEFAZOLIN SODIUM 2 G: 2 INJECTION, SOLUTION INTRAVENOUS at 22:52

## 2022-04-05 RX ADMIN — DOCUSATE SODIUM 50 MG AND SENNOSIDES 8.6 MG 1 TABLET: 8.6; 5 TABLET, FILM COATED ORAL at 20:54

## 2022-04-05 RX ADMIN — DEXAMETHASONE SODIUM PHOSPHATE 10 MG: 4 INJECTION, SOLUTION INTRA-ARTICULAR; INTRALESIONAL; INTRAMUSCULAR; INTRAVENOUS; SOFT TISSUE at 11:53

## 2022-04-05 RX ADMIN — ROCURONIUM BROMIDE 50 MG: 50 INJECTION, SOLUTION INTRAVENOUS at 11:18

## 2022-04-05 RX ADMIN — HYDROMORPHONE HYDROCHLORIDE 0.5 MG: 1 INJECTION, SOLUTION INTRAMUSCULAR; INTRAVENOUS; SUBCUTANEOUS at 12:39

## 2022-04-05 RX ADMIN — KETAMINE HYDROCHLORIDE 30 MG: 10 INJECTION, SOLUTION INTRAMUSCULAR; INTRAVENOUS at 11:18

## 2022-04-05 RX ADMIN — PROPOFOL 120 MG: 10 INJECTION, EMULSION INTRAVENOUS at 11:17

## 2022-04-05 RX ADMIN — Medication 2 G: at 14:58

## 2022-04-05 RX ADMIN — ACETAMINOPHEN 975 MG: 325 TABLET ORAL at 18:39

## 2022-04-05 RX ADMIN — PHENYLEPHRINE HYDROCHLORIDE 100 MCG: 10 INJECTION INTRAVENOUS at 11:46

## 2022-04-05 RX ADMIN — OXYCODONE HYDROCHLORIDE 5 MG: 5 TABLET ORAL at 20:54

## 2022-04-05 RX ADMIN — PHENYLEPHRINE HYDROCHLORIDE 100 MCG: 10 INJECTION INTRAVENOUS at 12:18

## 2022-04-05 RX ADMIN — TRANEXAMIC ACID 650 MG: 650 TABLET ORAL at 10:37

## 2022-04-05 RX ADMIN — DEXMEDETOMIDINE 0.5 MCG/KG/HR: 100 INJECTION, SOLUTION, CONCENTRATE INTRAVENOUS at 11:24

## 2022-04-05 RX ADMIN — FENTANYL CITRATE 100 MCG: 50 INJECTION, SOLUTION INTRAMUSCULAR; INTRAVENOUS at 11:17

## 2022-04-05 RX ADMIN — SODIUM CHLORIDE, POTASSIUM CHLORIDE, SODIUM LACTATE AND CALCIUM CHLORIDE: 600; 310; 30; 20 INJECTION, SOLUTION INTRAVENOUS at 12:06

## 2022-04-05 RX ADMIN — SUGAMMADEX 200 MG: 100 INJECTION, SOLUTION INTRAVENOUS at 15:43

## 2022-04-05 RX ADMIN — ACETAMINOPHEN 975 MG: 325 TABLET ORAL at 11:02

## 2022-04-05 ASSESSMENT — ACTIVITIES OF DAILY LIVING (ADL)
ADLS_ACUITY_SCORE: 12
ADLS_ACUITY_SCORE: 12
ADLS_ACUITY_SCORE: 14
ADLS_ACUITY_SCORE: 12

## 2022-04-05 ASSESSMENT — ENCOUNTER SYMPTOMS: DYSRHYTHMIAS: 1

## 2022-04-05 NOTE — OP NOTE
DATE OF SERVICE: 04/05/2022     PREOPERATIVE DIAGNOSES:   1.  L4-5 spondylolisthesis  2.  Severe L4-5 central stenosis  3.  Bilateral L4 foraminal stenosis  4.  Failure of conservative measures      POSTOPERATIVE DIAGNOSES:   1.  L4-5 spondylolisthesis  2.  Severe L4-5 central stenosis  3.  Bilateral L4 foraminal stenosis  4.  Failure of conservative measures     PROCEDURE:   1.Right-sided transforaminal/transfacet decompression of the exiting L4 and traversing L5 nerve roots.   2. Transforaminal lumbar interbody fusion L4-5 with autograft bone, Medtronic Catalyft interbody 23 x 9 to 15.   3. Posterior lateral fusion on the left at L4-5.   4. Segmental pedicle screw fixation in the pedicles of L4 and L5 bilaterally  5.  L4 laminectomy  6. O arm with intra operative navigation     [employer] navigated pedicle screws systems.      SURGEON: Dr. Brandon James.      ASSISTANT: Larissa Vasquez PA-C, who was needed for patient positioning, soft tissue retraction, patient safety and assistance with closure of the wound.  She was vital in the protection of the nerve roots as well as the dura.  This could only be performed by a surgical PA trained in spine     ESTIMATED BLOOD LOSS: 480 mL, 155 mL returned via Cell Saver     DRAINS: None, HV was placed and fell out prior to exiting the OR     COMPLICATIONS: None     SPECIMENS SENT: None.      HISTORY OF PRESENT ILLNESS AND INDICATIONS FOR PROCEDURE:   This is a pleasant 67 year old female that presented with severe bilateral buttock and leg pain.  MRI demonstrated L4-5 spondylolisthesis with severe facet arthropathy and spinal stenosis.  She underwent non-operative treatment with injections as well as therapy with no improvement long term.  We discussed because of this thats he was a candidate for surgery.  I discussed that a fusion would give her more reliable results given her spondylolisthesis and spinal stenosis.  She wished to proceed with the fusion.  We  discussed the risks and benefits which include but are not limited to bleeding, infection, damage to the nerve or dura, failure of procedure to provide pain relief, foot drop, iatrogenic instability, pseudoarthrosis, need for additional procedures, and risks associated with anesthesia.  The intention of the surgery is to treat her leg pain and will not reliably treat any back pain. She was completely clear on this.  Following this, she would like to proceed with the fusion.       DESCRIPTION OF PROCEDURE     Therefore, the patient was brought to the operating room. He was intubated via general endotracheal anesthetic and placed on a Amos table with a Osmar frame, care taken to pad all of his bony prominences. Pause for the Cause was performed correctly identifying the patient, procedure, proposed plan and radiographs and all were in agreement. We then dissected down over the L5 hemilamina bilaterally. We dissected over the facet joints at L4-5 bilaterally.  In addition to this, the transverse process was visualized at the L4 and L5 level.  O arm was brought in and a tracer was placed on the L5 spinous process.  An intraoperative CT scan was taken.  We then turned our attention to placing pedicle screws, first using a Midas Navarro bur, then a pedicle probe, then a May probe to palpate the cannulated pedicle. We then tapped the holes to 5.5 mm.  We then used the May probe again to palpate all 4 quadrants of our cannulated pedicle. We then placed 6.5x55 screws bilaterally at L4 and 6.5x45 bilaterally at L5.   A subsequent O arm spin was performed and demonstrated the screws in good position.     We then turned our attention to the decompression and interbody.  A laminotomy was made and the right L4 pars was scored allowing for the removal of the descending articular process of L4 along with the ascending articular process of L5. This gave us our transforaminal/transfacet decompression of the exiting L4, traversing  L5 nerve root.      We followed that L4 nerve root all the way out as well as the right L5 nerve in the lateral recess. At the disk space level, we performed a box annulotomy at L4-5, used a combination of ODC curettes, pituitary rongeurs and Kerrison rongeurs to remove the disk material. We took great care not to dig into the endplates. When all of the disk material that we could remove was taken out, we gently scored the endplates and placed Magnifuse and lisa along with autograft in the interbody space. We then placed the interbody graft,  a 23 mm length and 9 expanding to 15 mm height, Medtronic Catalyft cage. This had good fit and fill.       The lamina on the left was then cleaned and a laminotomy was made.  The spinous process was removed and the ligamentum was freed from the dura.  Significant stenosis was noted.  The medial facet was removed from the left side.     We then decorticated our transverse processes on the left at L4-5 as well as the facet joint and placed additional autograft, [place], and allograft in the defect.         We then placed our connectors and rods and tightened to the 's specifications. We irrigated the wound copiously, had good hemostasis. Vancomycin was placed over the hardware.  The fascia was closed with #1 Vicryl, subcutaneous tissue with 2-0 Vicryl, skin with a 3-0 monocryl.  A sterile dressing was applied. The patient tolerated the procedure well.      She will be weightbearing as tolerated bilateral lower extremities with strict instructions to avoid bending, lifting and twisting over the next 6 weeks. She may have a corset type brace for comfort and have early mobilization and ZORAN stockings for her DVT prophylaxis. She will have 2 grams of Ancef IV q.8 hours x2 doses for antibiotics or until his drain is removed. Return to see me in 6 weeks, sooner if there are any problems, questions or concerns.     Hardware used:  Medtronic Solera screws and rods with end  caps  Medtronic Elevate cage

## 2022-04-05 NOTE — ANESTHESIA PROCEDURE NOTES
Airway       Patient location during procedure: OR       Procedure Start/Stop Times: 4/5/2022 11:21 AM  Staff -        Anesthesiologist:  Ruy Cho MD       CRNA: Elena Vidales APRN CRNA       Performed By: CRNA  Consent for Airway        Urgency: elective  Indications and Patient Condition       Indications for airway management: shukri-procedural       Induction type:intravenous       Mask difficulty assessment: 2 - vent by mask + OA or adjuvant +/- NMBA    Final Airway Details       Final airway type: endotracheal airway       Successful airway: ETT - single and Oral  Endotracheal Airway Details        ETT size (mm): 7.0       Cuffed: yes       Successful intubation technique: direct laryngoscopy       DL Blade Type: Guadalupe 2       Grade View of Cords: 1       Adjucts: stylet and tooth guard       Position: Right       Measured from: lips       Secured at (cm): 20       Bite block used: Soft    Post intubation assessment        Placement verified by: capnometry, equal breath sounds and chest rise        Number of attempts at approach: 1       Number of other approaches attempted: 0       Secured with: silk tape       Ease of procedure: easy       Dentition: Intact and Unchanged

## 2022-04-05 NOTE — PHARMACY-ADMISSION MEDICATION HISTORY
Pharmacy Note - Admission Medication History    Pertinent Provider Information: clinic list had multiple topical lotion/creams and an eye drop, but she said not using.   ______________________________________________________________________    Prior To Admission (PTA) med list completed and updated in EMR.       PTA Med List   Medication Sig Last Dose     atorvastatin (LIPITOR) 40 MG tablet Take 40 mg by mouth At Bedtime 4/4/2022 at pm     Calcium Carbonate-Vitamin D 500-50 MG-UNIT CAPS Take 1 capsule by mouth daily 4/4/2022 at am     cyclobenzaprine (FLEXERIL) 5 MG tablet Take 5 mg by mouth 3 times daily as needed for muscle spasms 4/3/2022     ketotifen (ZADITOR) 0.025 % ophthalmic solution Place 1 drop into both eyes 2 times daily as needed  4/4/2022 at pm     loratadine (CLARITIN) 10 MG tablet Take 10 mg by mouth daily 4/5/2022 at am     losartan (COZAAR) 25 MG tablet Take 25 mg by mouth daily 4/5/2022 at am     omeprazole (PRILOSEC) 40 MG DR capsule Take 40 mg by mouth daily 4/4/2022 at am     polyvinyl alcohol (LIQUIFILM TEARS) 1.4 % ophthalmic solution Place 1 drop into both eyes as needed for dry eyes 4/4/2022 at pm     rivaroxaban ANTICOAGULANT (XARELTO) 20 MG TABS tablet Take 20 mg by mouth daily 3/30/2022     vitamin D3 (CHOLECALCIFEROL) 50 mcg (2000 units) tablet Take 50 mcg by mouth daily  4/5/2022 at am       Information source(s): Patient, Clinic records and [facility]    Method of interview communication: in-person    Patient was asked about OTC/herbal products specifically.  PTA med list reflects this.    Based on the pharmacist's assessment, the PTA med list information appears reliable    Allergies were reviewed, assessed, and updated with the patient.      Medications currently not available for use during hospital stay. Family/Patient representative states they will bring not sure if will need inpatient at this time. to [facility].     Thank you for the opportunity to  participate in the care of this patient.      Brooks Chang [facility]     4/5/2022     10:56 AM

## 2022-04-05 NOTE — ANESTHESIA CARE TRANSFER NOTE
Patient: Martha Hubbard    Procedure: Procedure(s):  RIGHT LUMBAR 4-5 TRANSFORAMINAL LUMBAR INTERBODY FUSION WITH LAMINECTOMY USING STEALTH NAVIGATION       Diagnosis: Spinal stenosis, lumbar [M48.061]  Spondylolisthesis [M43.10]  Diagnosis Additional Information: No value filed.    Anesthesia Type:   General     Note:    Oropharynx: oropharynx clear of all foreign objects and spontaneously breathing  Level of Consciousness: drowsy  Oxygen Supplementation: face mask  Level of Supplemental Oxygen (L/min / FiO2): 8  Independent Airway: airway patency satisfactory and stable  Dentition: dentition unchanged  Vital Signs Stable: post-procedure vital signs reviewed and stable  Report to RN Given: handoff report given  Patient transferred to: PACU    Handoff Report: Identifed the Patient, Identified the Reponsible Provider, Reviewed the pertinent medical history, Discussed the surgical course, Reviewed Intra-OP anesthesia mangement and issues during anesthesia, Set expectations for post-procedure period and Allowed opportunity for questions and acknowledgement of understanding      Vitals:  Vitals Value Taken Time   /60 04/05/22 1554   Temp 38.3  C (100.9  F) 04/05/22 1554   Pulse 67 04/05/22 1554   Resp 20 04/05/22 1554   SpO2 96 % 04/05/22 1554   Vitals shown include unvalidated device data.    Electronically Signed By: MAYA Matthews CRNA  April 5, 2022  3:55 PM

## 2022-04-05 NOTE — ANESTHESIA POSTPROCEDURE EVALUATION
Patient: Martha Hubbard    Procedure: Procedure(s):  RIGHT LUMBAR 4-5 TRANSFORAMINAL LUMBAR INTERBODY FUSION WITH LAMINECTOMY USING STEALTH NAVIGATION       Anesthesia Type:  General    Note:  Disposition: Admission   Postop Pain Control: Uneventful            Sign Out: Well controlled pain   PONV: No   Neuro/Psych: Uneventful            Sign Out: Acceptable/Baseline neuro status   Airway/Respiratory: Uneventful            Sign Out: Acceptable/Baseline resp. status   CV/Hemodynamics: Uneventful            Sign Out: Acceptable CV status; No obvious hypovolemia; No obvious fluid overload   Other NRE: NONE   DID A NON-ROUTINE EVENT OCCUR? No           Last vitals:  Vitals Value Taken Time   /67 04/05/22 1650   Temp 38.3  C (100.9  F) 04/05/22 1554   Pulse 58 04/05/22 1656   Resp 27 04/05/22 1656   SpO2 97 % 04/05/22 1656   Vitals shown include unvalidated device data.    Electronically Signed By: Santos Gallegos MD  April 5, 2022  4:57 PM

## 2022-04-05 NOTE — CONSULTS
[facility] History and Physical          A/P    DM2: on no chronic meds per admission med list.  -sliding scale insulin/accuchecks ac/hs  -check A1C    HTN:  Low normotensive BP's  -hold losartan for tonight and reassess in a.m.  -monitor    PAF:  Most recent ECG 3/24/22 showed NSR. On no chronic AVN blocking agents  -monitor  -hold Xarelto until cleared by surgery for resumption    POD#0: Right-sided transforaminal/transfacet decompression of the exiting L4 and traversing L5 nerve roots, Transforaminal lumbar interbody fusion L4-5 with autograft bone, Posterior lateral fusion on the left at L4-5, Segmental pedicle screw fixation in the pedicles of L4 and L5 bilaterally, L4 laminectomy  -management per surgery    Full code    Thank you for consult. [facility] will follow.          Chief Complaint:     Postop consult medical management     HPI:    67 y.o. male w/ PMHx of DM2, atrial fibrillation, HTN, HLD admitted after undergoing elective Right-sided transforaminal/transfacet decompression of the exiting L4 and traversing L5 nerve roots, Transforaminal lumbar interbody fusion L4-5 with autograft bone, Posterior lateral fusion on the left at L4-5, Segmental pedicle screw fixation in the pedicles of L4 and L5 bilaterally, L4 laminectomy.  HMS consulted for postoperative medical management of chronic medical issues.         Past Medical History:     Past Medical History:   Diagnosis Date     Allergic rhinitis      Atrial fibrillation (H)      Claudication in peripheral vascular disease (H)      Diabetes mellitus (H)      Headache      Hip pain      Hyperlipidemia      Hypertension      Muscle weakness      Myalgia      Numbness of hand      Obesity      Osteoarthritis      Spinal stenosis     lumbar     Vitamin D deficiency              Past Surgical History:    History reviewed. No pertinent surgical history.          Social History:     Social History     Tobacco Use     Smoking status: Never Smoker      Smokeless tobacco: Never Used   Substance Use Topics     Alcohol use: No             Family History:   History reviewed. No pertinent family history.  Family history reviewed and updated in EPIC            Allergies:     Allergies   Allergen Reactions     Gabapentin Rash             Medications:   reviewed         Review of Systems:     The Review of Systems is negative other than noted in the HPI      /67 (BP Location: Left arm)   Pulse 92   Temp 97.6  F (36.4  C) (Oral)   Resp 16   Wt 82.3 kg (181 lb 8 oz)   SpO2 98%   BMI 34.29 kg/m     Physical Examination:   General appearance - alert, and in no distress  Eyes - pupils equal and reactive, extraocular eye movements intact, sclera anicteric  Mouth - mucous membranes pasty, pharynx normal without lesions  Lungs - clear to auscultation, no wheezes, rales or rhonchi, symmetric air entry  Heart - normal rate, regular rhythm, normal S1, S2, no murmurs, rubs, clicks or gallops. No peripheral edema.  Abdomen - soft, nontender, nondistended, no masses or organomegaly, BS+  Neurological - alert, oriented, normal speech, no focal findings or movement disorder noted  Skin - no c/c/p                Data:   Lab/imaging studies reviewed    ECG from 3/14/22 personally reviewed.  NSR. No acute ischemic changes.      Daughter at bedside present and did interpretation    Dany Saravia DO, DO

## 2022-04-05 NOTE — ANESTHESIA PREPROCEDURE EVALUATION
Anesthesia Pre-Procedure Evaluation    Patient: Martha Hubbard   MRN: [id number] : 1955        Procedure : Procedure(s):  RIGHT LUMBAR 4-5 TRANSFORAMINAL LUMBAR INTERBODY FUSION WITH LAMINECTOMY USING STEALTH NAVIGATION          Past Medical History:   Diagnosis Date     Allergic rhinitis      Atrial fibrillation (H)      Claudication in peripheral vascular disease (H)      Diabetes mellitus (H)      Headache      Hip pain      Hyperlipidemia      Hypertension      Muscle weakness      Myalgia      Numbness of hand      Obesity      Osteoarthritis      Spinal stenosis     lumbar     Vitamin D deficiency       No past surgical history on file.   Allergies   Allergen Reactions     Gabapentin Rash      Social History     Tobacco Use     Smoking status: Never Smoker     Smokeless tobacco: Never Used   Substance Use Topics     Alcohol use: No      Wt Readings from Last 1 Encounters:   22 82.3 kg (181 lb 8 oz)        Anesthesia Evaluation            ROS/MED HX  ENT/Pulmonary:  - neg pulmonary ROS     Neurologic:  - neg neurologic ROS     Cardiovascular:     (+) hypertension-----Taking blood thinners Instructions Given to patient: off xarelto since 3/30. dysrhythmias, a-fib,     METS/Exercise Tolerance:     Hematologic:       Musculoskeletal:       GI/Hepatic:  - neg GI/hepatic ROS     Renal/Genitourinary:  - neg Renal ROS     Endo:  - neg endo ROS   (+) type II DM, Obesity,     Psychiatric/Substance Use:       Infectious Disease:       Malignancy:       Other:            Physical Exam    Airway        Mallampati: II   TM distance: > 3 FB   Neck ROM: full     Respiratory Devices and Support         Dental  no notable dental history     (+) caps      Cardiovascular          Rhythm and rate: regular and normal     Pulmonary           breath sounds clear to auscultation           OUTSIDE LABS:  CBC:   Lab Results   Component Value Date    WBC 10.7 2018    HGB 14.9 2018    HCT 43.8 2018      09/16/2018     BMP:   Lab Results   Component Value Date     09/16/2018    POTASSIUM 3.9 09/16/2018    CHLORIDE 106 09/16/2018    CO2 25 09/16/2018    BUN 18 09/16/2018    CR 1.02 09/16/2018    GLC 83 04/05/2022     (H) 09/16/2018     COAGS: No results found for: PTT, INR, FIBR  POC: No results found for: BGM, HCG, HCGS  HEPATIC: No results found for: ALBUMIN, PROTTOTAL, ALT, AST, GGT, ALKPHOS, BILITOTAL, BILIDIRECT, DARIEL  OTHER:   Lab Results   Component Value Date    ROBBIN 10.0 09/16/2018    MAG 2.0 09/16/2018       Anesthesia Plan    ASA Status:  3   NPO Status:  NPO Appropriate    Anesthesia Type: General.     - Airway: ETT         Techniques and Equipment:     - Lines/Monitors: 2nd IV     - Drips/Meds: Ketamine     Consents    Anesthesia Plan(s) and associated risks, benefits, and realistic alternatives discussed. Questions answered and patient/representative(s) expressed understanding.    - Discussed:     - Discussed with:  Patient,  (via [place]  [id number])      - Extended Intubation/Ventilatory Support Discussed: No.      - Patient is DNR/DNI Status: No    Use of blood products discussed: No .     Postoperative Care       PONV prophylaxis: Ondansetron (or other 5HT-3), Dexamethasone or Solumedrol, Background Propofol Infusion     Comments:                Morgan Addison MD

## 2022-04-06 ENCOUNTER — DOCUMENTATION ONLY (OUTPATIENT)
Dept: ORTHOPEDICS | Facility: CLINIC | Age: 67
End: 2022-04-06
Payer: COMMERCIAL

## 2022-04-06 ENCOUNTER — APPOINTMENT (OUTPATIENT)
Dept: RADIOLOGY | Facility: HOSPITAL | Age: 67
DRG: 455 | End: 2022-04-06
Attending: INTERNAL MEDICINE
Payer: COMMERCIAL

## 2022-04-06 ENCOUNTER — APPOINTMENT (OUTPATIENT)
Dept: PHYSICAL THERAPY | Facility: HOSPITAL | Age: 67
DRG: 455 | End: 2022-04-06
Attending: ORTHOPAEDIC SURGERY
Payer: COMMERCIAL

## 2022-04-06 ENCOUNTER — APPOINTMENT (OUTPATIENT)
Dept: OCCUPATIONAL THERAPY | Facility: HOSPITAL | Age: 67
DRG: 455 | End: 2022-04-06
Attending: ORTHOPAEDIC SURGERY
Payer: COMMERCIAL

## 2022-04-06 LAB
ANION GAP SERPL CALCULATED.3IONS-SCNC: 10 MMOL/L (ref 5–18)
BUN SERPL-MCNC: 17 MG/DL (ref 8–22)
CALCIUM SERPL-MCNC: 9.3 MG/DL (ref 8.5–10.5)
CHLORIDE BLD-SCNC: 105 MMOL/L (ref 98–107)
CO2 SERPL-SCNC: 24 MMOL/L (ref 22–31)
CREAT SERPL-MCNC: 0.88 MG/DL (ref 0.6–1.1)
ERYTHROCYTE [DISTWIDTH] IN BLOOD BY AUTOMATED COUNT: 12.8 % (ref 10–15)
GFR SERPL CREATININE-BSD FRML MDRD: 72 ML/MIN/1.73M2
GLUCOSE BLD-MCNC: 118 MG/DL (ref 70–125)
GLUCOSE BLDC GLUCOMTR-MCNC: 111 MG/DL (ref 70–99)
GLUCOSE BLDC GLUCOMTR-MCNC: 125 MG/DL (ref 70–99)
GLUCOSE BLDC GLUCOMTR-MCNC: 164 MG/DL (ref 70–99)
GLUCOSE BLDC GLUCOMTR-MCNC: 213 MG/DL (ref 70–99)
GLUCOSE BLDC GLUCOMTR-MCNC: 99 MG/DL (ref 70–99)
HCT VFR BLD AUTO: 37.2 % (ref 35–47)
HGB BLD-MCNC: 12.3 G/DL (ref 11.7–15.7)
MCH RBC QN AUTO: 30.9 PG (ref 26.5–33)
MCHC RBC AUTO-ENTMCNC: 33.1 G/DL (ref 31.5–36.5)
MCV RBC AUTO: 94 FL (ref 78–100)
PLATELET # BLD AUTO: 253 10E3/UL (ref 150–450)
POTASSIUM BLD-SCNC: 4.4 MMOL/L (ref 3.5–5)
RBC # BLD AUTO: 3.98 10E6/UL (ref 3.8–5.2)
SODIUM SERPL-SCNC: 139 MMOL/L (ref 136–145)
WBC # BLD AUTO: 15.7 10E3/UL (ref 4–11)

## 2022-04-06 PROCEDURE — 250N000013 HC RX MED GY IP 250 OP 250 PS 637: Performed by: ORTHOPAEDIC SURGERY

## 2022-04-06 PROCEDURE — 250N000013 HC RX MED GY IP 250 OP 250 PS 637: Performed by: INTERNAL MEDICINE

## 2022-04-06 PROCEDURE — 97116 GAIT TRAINING THERAPY: CPT | Mod: GP

## 2022-04-06 PROCEDURE — 73502 X-RAY EXAM HIP UNI 2-3 VIEWS: CPT

## 2022-04-06 PROCEDURE — 258N000003 HC RX IP 258 OP 636

## 2022-04-06 PROCEDURE — 80048 BASIC METABOLIC PNL TOTAL CA: CPT | Performed by: INTERNAL MEDICINE

## 2022-04-06 PROCEDURE — 97535 SELF CARE MNGMENT TRAINING: CPT | Mod: GO

## 2022-04-06 PROCEDURE — 36415 COLL VENOUS BLD VENIPUNCTURE: CPT | Performed by: INTERNAL MEDICINE

## 2022-04-06 PROCEDURE — 250N000011 HC RX IP 250 OP 636

## 2022-04-06 PROCEDURE — 250N000013 HC RX MED GY IP 250 OP 250 PS 637

## 2022-04-06 PROCEDURE — 97162 PT EVAL MOD COMPLEX 30 MIN: CPT | Mod: GP

## 2022-04-06 PROCEDURE — 99233 SBSQ HOSP IP/OBS HIGH 50: CPT | Performed by: INTERNAL MEDICINE

## 2022-04-06 PROCEDURE — 120N000001 HC R&B MED SURG/OB

## 2022-04-06 PROCEDURE — 97110 THERAPEUTIC EXERCISES: CPT | Mod: GP

## 2022-04-06 PROCEDURE — 85014 HEMATOCRIT: CPT | Performed by: INTERNAL MEDICINE

## 2022-04-06 PROCEDURE — 97165 OT EVAL LOW COMPLEX 30 MIN: CPT | Mod: GO

## 2022-04-06 RX ORDER — LORATADINE 10 MG/1
10 TABLET, ORALLY DISINTEGRATING ORAL DAILY
Status: DISCONTINUED | OUTPATIENT
Start: 2022-04-06 | End: 2022-04-06

## 2022-04-06 RX ORDER — ATORVASTATIN CALCIUM 40 MG/1
40 TABLET, FILM COATED ORAL EVERY EVENING
Status: DISCONTINUED | OUTPATIENT
Start: 2022-04-06 | End: 2022-04-07 | Stop reason: HOSPADM

## 2022-04-06 RX ADMIN — POLYETHYLENE GLYCOL 3350 17 G: 17 POWDER, FOR SOLUTION ORAL at 09:09

## 2022-04-06 RX ADMIN — SODIUM CHLORIDE: 9 INJECTION, SOLUTION INTRAVENOUS at 13:14

## 2022-04-06 RX ADMIN — METHOCARBAMOL 500 MG: 500 TABLET, FILM COATED ORAL at 20:30

## 2022-04-06 RX ADMIN — ACETAMINOPHEN 975 MG: 325 TABLET ORAL at 00:25

## 2022-04-06 RX ADMIN — LORATADINE 10 MG: 10 TABLET ORAL at 09:03

## 2022-04-06 RX ADMIN — ATORVASTATIN CALCIUM 40 MG: 40 TABLET, FILM COATED ORAL at 20:20

## 2022-04-06 RX ADMIN — ACETAMINOPHEN 975 MG: 325 TABLET ORAL at 18:09

## 2022-04-06 RX ADMIN — INSULIN ASPART 1 UNITS: 100 INJECTION, SOLUTION INTRAVENOUS; SUBCUTANEOUS at 12:15

## 2022-04-06 RX ADMIN — OXYCODONE HYDROCHLORIDE 5 MG: 5 TABLET ORAL at 20:19

## 2022-04-06 RX ADMIN — SODIUM CHLORIDE: 9 INJECTION, SOLUTION INTRAVENOUS at 04:35

## 2022-04-06 RX ADMIN — PANTOPRAZOLE SODIUM 40 MG: 40 TABLET, DELAYED RELEASE ORAL at 07:30

## 2022-04-06 RX ADMIN — OXYCODONE HYDROCHLORIDE 5 MG: 5 TABLET ORAL at 10:04

## 2022-04-06 RX ADMIN — CEFAZOLIN SODIUM 2 G: 2 INJECTION, SOLUTION INTRAVENOUS at 06:05

## 2022-04-06 RX ADMIN — THERA TABS 1 TABLET: TAB at 09:09

## 2022-04-06 RX ADMIN — DOCUSATE SODIUM 50 MG AND SENNOSIDES 8.6 MG 1 TABLET: 8.6; 5 TABLET, FILM COATED ORAL at 20:19

## 2022-04-06 RX ADMIN — ACETAMINOPHEN 975 MG: 325 TABLET ORAL at 09:03

## 2022-04-06 RX ADMIN — DOCUSATE SODIUM 50 MG AND SENNOSIDES 8.6 MG 1 TABLET: 8.6; 5 TABLET, FILM COATED ORAL at 09:03

## 2022-04-06 ASSESSMENT — ACTIVITIES OF DAILY LIVING (ADL)
ADLS_ACUITY_SCORE: 18
ADLS_ACUITY_SCORE: 16
ADLS_ACUITY_SCORE: 16
ADLS_ACUITY_SCORE: 18
ADLS_ACUITY_SCORE: 14
ADLS_ACUITY_SCORE: 18
ADLS_ACUITY_SCORE: 18
ADLS_ACUITY_SCORE: 16
ADLS_ACUITY_SCORE: 18
ADLS_ACUITY_SCORE: 18
ADLS_ACUITY_SCORE: 14
ADLS_ACUITY_SCORE: 14
ADLS_ACUITY_SCORE: 18
ADLS_ACUITY_SCORE: 16
ADLS_ACUITY_SCORE: 18
DEPENDENT_IADLS:: INDEPENDENT
ADLS_ACUITY_SCORE: 18
ADLS_ACUITY_SCORE: 16
ADLS_ACUITY_SCORE: 18
ADLS_ACUITY_SCORE: 18

## 2022-04-06 NOTE — PROGRESS NOTES
Occupational Therapy       04/06/22 1000   Quick Adds   Type of Visit Initial Occupational Therapy Evaluation   Living Environment   Living Environment Comments see PT note   Self-Care   Activity/Exercise/Self-Care Comment see PT note   General Information   Onset of Illness/Injury or Date of Surgery 04/05/22   Referring Physician Larissa Vasquez, KASIE   Patient/Family Therapy Goal Statement (OT) get home   Existing Precautions/Restrictions spinal;brace worn when out of bed   Cognitive Status Examination   Orientation Status orientation to person, place and time   Transfers   Transfers sit-stand transfer   Sit-Stand Transfer   Sit-Stand [place] (Transfers) contact guard   Sit/Stand Transfer Comments Mod I for LE dressing, CGA for static standing.   Clinical Impression   Criteria for Skilled Therapeutic Interventions Met (OT) Yes, treatment indicated   OT Diagnosis Decreased ADL independence   Influenced by the following impairments s/p spinal surgery   OT Problem List-Impairments impacting ADL problems related to;activity tolerance impaired;post-surgical precautions   Assessment of Occupational Performance 1-3 Performance Deficits   Identified Performance Deficits dressing, toileting, bed mob   Planned Therapy Interventions (OT) ADL retraining;bed mobility training;transfer training   Clinical Decision Making Complexity (OT) low complexity   Risk & Benefits of therapy have been explained evaluation/treatment results reviewed   OT Discharge Planning   OT Discharge Recommendation (DC Rec) home with assist   OT Goals   Therapy Frequency (OT) 2 times/day   OT Predicated Duration/Target Date for Goal Attainment 04/15/22   OT Goals Lower Body Dressing;Bed Mobility;Transfers   OT: Lower Body Dressing Modified independent;within precautions   OT: Bed Mobility Supervision/stand-by assist;within precautions   OT: Transfer Supervision/stand-by assist;within precautions

## 2022-04-06 NOTE — PROGRESS NOTES
Assessment: 1 Day Post-Op  S/P Procedure(s):  RIGHT LUMBAR 4-5 TRANSFORAMINAL LUMBAR INTERBODY FUSION WITH LAMINECTOMY USING STEALTH NAVIGATION     Plan:   - Continue PT/OT - advance activities as tolerated.  Lumbar corset when patient is out of bed for support.  - Weightbearing status: WBAT  - Anticoagulation: SCDs, antonette stockings and early ambulation.  Per Dr. James, he would like to hold Xarelto resumption until postop day #6 if the hospitalist is okay with this.  - Leave dressing in place, changing only if indicated by drainage.  - Pain meds as needed.  - Discharge planning: Home tomorrow if pain adequately controlled and making good progress.      Subjective: Patient is sitting up in the chair eating breakfast.  States that she has fairly minimal discomfort in her back at this time.  Medications have been effective when she is needed them.  Slept okay last night.  Gonzales catheter was removed overnight and she has been able to void without difficulty since.    She is complaining of some dizziness this morning but thinks it may be because she has not eaten much.  Her blood pressure has been fairly stable since surgery.  Notes that the pain she was having in her right leg prior to surgery has resolved.  She does report some numbness over the lateral aspect of her left thigh which I get the impression is new since her surgery.  She otherwise has no complaints.      Objective:  /45 (BP Location: Left arm)   Pulse 60   Temp 97.6  F (36.4  C) (Oral)   Resp 18   Wt 82.3 kg (181 lb 8 oz)   SpO2 97%   BMI 34.29 kg/m      The patient is A&O x3. Appears comfortable.  Sitting up in the chair eating breakfast.   She has her lumbar corset on so I did not inspect her surgical dressing.  She is able to move both of her legs fairly well with no reports of pain.  She does complain of some numbness over the lateral aspect of her left thigh but otherwise has intact sensation.  Her calves are supple and  nontender.      Pertinent Labs   Lab Results: personally reviewed.   No results found for: INR, PROTIME  Lab Results   Component Value Date    WBC 15.7 (H) 04/06/2022    HGB 12.3 04/06/2022    HCT 37.2 04/06/2022    MCV 94 04/06/2022     04/06/2022     Lab Results   Component Value Date     04/06/2022    CO2 24 04/06/2022         Report completed by:  Jared Layton PA-C  Date: 4/6/2022  Time: 10:25 AM

## 2022-04-06 NOTE — PROGRESS NOTES
[facility]    Medicine Progress Note - Hospitalist Service    Date of Admission:  4/5/2022    Assessment & Plan        Martha Hubbard is a 67-year-old woman with history of type 2 diabetes, hypertension, hyperlipidemia, atrial fibrillation on DOAC and obesity admitted on 4/5/2022 by orthopedic surgery service for elective repair of severe L4-L5 central stenosis and spondylosis.  She underwent L4-L5 fusion, and L4 laminectomy on 4/5/2022.  Hospital service was consulted for management of comorbidities.    Severe L4-L5 central stenosis and spondylosis   She underwent L4-L5 fusion, and L4 laminectomy on 4/5/2022  Spinal drain was removed this morning  Wound care and surgical management as per orthopedic surgeon  PT/OT  Analgesics as needed    Hypertension  Blood pressure is controlled  Will consider resuming PTA losartan 25 mg daily if blood pressure is elevated    Type 2 diabetes  Glucose is controlled  Continue insulin sliding scale    Paroxysmal atrial fibrillation  She states she was told by her PCP to hold DOAC 5 days prior to her surgery and not after her surgery  Heart rate is controlled  Resume Xarelto when cleared by orthopedic surgery service    Hyperlipidemia  Resume PTA atorvastatin 40 mg at bedtime     Diet: Regular Diet Adult  Discharge Instruction - Regular Diet Adult    DVT Prophylaxis: Pneumatic Compression Devices  Gonzales Catheter: Not present  Central Lines: None  Cardiac Monitoring: None  Code Status: Full Code      Disposition Plan   Expected Discharge: 04/07/2022     Anticipated discharge location: home with family    Delays:    Orthopedic surgery recommendation       The patient's care was discussed with the Patient and patient's daughter at bedside.    Lalo Galvez MD  Hospitalist Service  [facility]  Securely message with the Vocera Web Console (learn more here)  Text page via TouchMail Paging/Directory         Clinically Significant Risk Factors  Present on Admission                 ______________________________________________________________________    Interval History   She complains of pain and numbness in the right thigh.  She requested for PT for allergy, cholesterol and blood pressure medications.  Daughter is at bedside; updated about plans    Data reviewed today: I reviewed all medications, new labs and imaging results over the last 24 hours. I personally reviewed no images or EKG's today[place]   [place] [id number] assisted with translation.  Physical Exam   Vital Signs: Temp: 97.7  F (36.5  C) Temp src: Oral BP: 118/63 Pulse: 68   Resp: 18 SpO2: 98 % O2 Device: None (Room air) Oxygen Delivery: 2 LPM  Weight: 181 lbs 8 oz  Exam:  Constitutional: Appears stated age, not in any obvious distress, afebrile, anicteric, not dehydrated, alert, and oriented  Head: Normocephalic. No masses, lesions, tenderness or abnormalities  ENT: ENT exam normal, no neck nodes or sinus tenderness  Cardiovascular: RRR. S1 and S2. No murmurs, clicks gallops or rub, No edema or JVD.  No lifts, heaves, or thrills.  Respiratory: No increased work of breathing, good air exchange, clear to auscultation bilaterally, no crackles or wheezing  Back: Surgical wound is clean and dry  Gastrointestinal: Abdomen soft, non-tender. BS normal. No masses, organomegaly  Musculoskeletal: extremities normal- motion   Skin: no suspicious lesions or rashes  Neurologic:  Moving extremities.  Normal speech.  No facial weakness  Psychiatric: mentation appears normal          Data   No results found for this or any previous visit (from the past 24 hour(s)).

## 2022-04-06 NOTE — PLAN OF CARE
"  Problem: Bleeding (Spinal Surgery)  Goal: Absence of Bleeding  Outcome: Ongoing, Progressing     Problem: Bowel Motility Impaired (Spinal Surgery)  Goal: Effective Bowel Elimination  Outcome: Ongoing, Progressing     Problem: Fluid and Electrolyte Imbalance (Spinal Surgery)  Goal: Fluid and Electrolyte Balance  Outcome: Ongoing, Progressing     Problem: Functional Ability Impaired (Spinal Surgery)  Goal: Optimal Functional Ability  Outcome: Ongoing, Progressing  Intervention: Optimize Functional Status  Recent Flowsheet Documentation  Taken 4/6/2022 1004 by Jess Goel RN  Activity Management: ambulated to bathroom     Problem: Infection (Spinal Surgery)  Goal: Absence of Infection Signs and Symptoms  Outcome: Ongoing, Progressing     Problem: Neurologic Impairment (Spinal Surgery)  Goal: Optimal Neurologic Function  Outcome: Ongoing, Progressing     Problem: Pain (Spinal Surgery)  Goal: Acceptable Pain Control  Outcome: Ongoing, Progressing     Problem: Postoperative Urinary Retention (Spinal Surgery)  Goal: Effective Urinary Elimination  Outcome: Ongoing, Progressing     Problem: Respiratory Compromise (Spinal Surgery)  Goal: Effective Oxygenation and Ventilation  Outcome: Ongoing, Progressing     Problem: Plan of Care - These are the overarching goals to be used throughout the patient stay.    Goal: Plan of Care Review/Shift Note  Description: The Plan of Care Review/Shift note should be completed every shift.  The Outcome Evaluation is a brief statement about your assessment that the patient is improving, declining, or no change.  This information will be displayed automatically on your shift note.  Outcome: Ongoing, Progressing  Goal: Patient-Specific Goal (Individualized)  Description: You can add care plan individualizations to a care plan. Examples of Individualization might be:  \"Parent requests to be called daily at 9am for status\", \"I have a hard time hearing out of my right ear\", or \"Do not " "touch me to wake me up as it startles me\".  Outcome: Ongoing, Progressing  Goal: Absence of Hospital-Acquired Illness or Injury  Outcome: Ongoing, Progressing  Intervention: Identify and Manage Fall Risk  Recent Flowsheet Documentation  Taken 4/6/2022 1004 by Jess Goel, RN  Safety Promotion/Fall Prevention:   room door open   nonskid shoes/slippers when out of bed   mobility aid in reach   lighting adjusted   fall prevention program maintained   bed alarm on   chair alarm on   assistive device/personal items within reach  Intervention: Prevent and Manage VTE (Venous Thromboembolism) Risk  Recent Flowsheet Documentation  Taken 4/6/2022 1004 by Jess Goel, RN  Activity Management: ambulated to bathroom  Goal: Optimal Comfort and Wellbeing  Outcome: Ongoing, Progressing  Goal: Readiness for Transition of Care  Outcome: Ongoing, Progressing  Pt passes PVR's and voiding well. Pt sating O2 97%RA. Pt reeducated re oral pain meds and agreed to taking oxycodone 5mg, effective. Pt up to br three times and sitting in chair this am, worked with PT. Encouraged oral intake. Surgical dressing changed due to bunching and rolling up back. Site C/D/I no redness.     Goal Outcome Evaluation:                      "

## 2022-04-06 NOTE — CONSULTS
Care Management Initial Consult    General Information  Assessment completed with: PatientMartha  Type of CM/SW Visit: Initial Assessment    Primary Care Provider verified and updated as needed: Yes   Readmission within the last 30 days: no previous admission in last 30 days      Reason for Consult: discharge planning  Advance Care Planning:            Communication Assessment  Patient's communication style: spoken language (non-English)             Cognitive  Cognitive/Neuro/Behavioral: WDL     Arousal Level: opens eyes spontaneously                Living Environment:   People in home: child(rey), adult     Current living Arrangements: house      Able to return to prior arrangements: yes       Family/Social Support:  Care provided by: self  Provides care for: no one                Description of Support System:           Current Resources:   Patient receiving home care services: No     Community Resources: None  Equipment currently used at home: none  Supplies currently used at home: None    Employment/Financial:  Employment Status:          Financial Concerns:             Lifestyle & Psychosocial Needs:  Social Determinants of Health     Tobacco Use: Low Risk      Smoking Tobacco Use: Never Smoker     Smokeless Tobacco Use: Never Used   Alcohol Use: Not on file   Financial Resource Strain: Not on file   Food Insecurity: Not on file   Transportation Needs: Not on file   Physical Activity: Not on file   Stress: Not on file   Social Connections: Not on file   Intimate Partner Violence: Not on file   Depression: Not on file   Housing Stability: Not on file       Functional Status:  Prior to admission patient needed assistance:   Dependent ADLs:: Independent  Dependent IADLs:: Independent       Mental Health Status:          Chemical Dependency Status:                Values/Beliefs:  Spiritual, Cultural Beliefs, Orthodoxy Practices, Values that affect care:                 Additional Information:  Assessed with pt in  room  (Private Outlet) used. Lives in a house with son. Independent with ADL's. No equipment or services. Family to transport at discharge. CM to follow medical progression, recommendations and final discharge plan.      Veena Trujillo RN

## 2022-04-06 NOTE — PLAN OF CARE
Problem: Bleeding (Spinal Surgery)  Goal: Absence of Bleeding  Outcome: Ongoing, Progressing     Problem: Pain (Spinal Surgery)  Goal: Acceptable Pain Control  4/6/2022 0314 by Amy Azul RN  Outcome: Ongoing, Progressing     Problem: Postoperative Urinary Retention (Spinal Surgery)  Goal: Effective Urinary Elimination  4/6/2022 0314 by Amy Azul RN  Outcome: Ongoing, Progressing     Goal Outcome Evaluation:    Vs stable,scheduled tylenol effective for pain control,incision site dressing clean dry and intact,mccarthy cath discontinued @ 3 AM,voded x 1 and PVR was 50 ml. Will continue to monitor.

## 2022-04-06 NOTE — PROGRESS NOTES
S: Martha Hubbard was seen at the [facility], PACU, for the Evaluation, Fit and Delivery of a Corset Style Orthosis.      Dx:  S/P Spinal Surgery    O: The patient s waist circumference was measured to be 43.5 inches.   An X-Large Ple[place] Corset Style Orthosis was selected.  The rigid anterior panel was assembled and set to the patient s midline.  The compression handles were then adjusted to contour the posterior panel of the orthosis to provide a custom fit the orthosis to the patient.      A:  The patient was lightly sedated, so I explained the purpose and function of the brace as well as donning and doffing to the patient s Nurse.  I also provided the patient with the written use and care instructions from the .    P: If there is a need to adjust the brace while the patient still in the hospital, the patient s nurse should contact the [facility].  Adjustments made outside of the hospital can be performed, at no charge, at any of the [facility].    G: This orthosis applies compression to the patient s soft tissues in the lumbosacral region of the spine.  Compression of the patient s soft tissues unloads the vertebrae, which in turn reduces pain in the spine.  The orthosis also increases medial-lateral and anterior-posterior stability of the back/spine.  The goal of this orthosis is to reduce pain and increase the comfort while the patient performs their ADLs.    Please contact the [facility] at [phone] if you have any questions.  Thank you, Willie    Electronically signed by Willie Allen CPO, LPO

## 2022-04-06 NOTE — PLAN OF CARE
Patient is Hmong speaking, but can speak a little bit of English. Daughter was here most part of the evening and was helpful with interpreting. Patient's pain and discomfort was managed with scheduled tylenol, prn Oxycodone and use of ice packs. Patient denied nausea and vomiting. Acknowledged feeling slightly dizzy when staff stood patient up by the bedside. Patient sat by the bedside for about half an hour. Vital signs are stable. Surgical incision dressing intact with no bleeding. Gonzales catheter will remain in place till A.M. Blood sugars were 147 and 133.  Problem: Bowel Motility Impaired (Spinal Surgery)  Goal: Effective Bowel Elimination  Outcome: Ongoing, Progressing     Problem: Fluid and Electrolyte Imbalance (Spinal Surgery)  Goal: Fluid and Electrolyte Balance  Outcome: Ongoing, Progressing     Problem: Functional Ability Impaired (Spinal Surgery)  Goal: Optimal Functional Ability  Outcome: Ongoing, Progressing  Intervention: Optimize Functional Status  Recent Flowsheet Documentation  Taken 4/5/2022 1900 by Inessa Gonzales RN  Activity Management: activity encouraged     Problem: Pain (Spinal Surgery)  Goal: Acceptable Pain Control  Outcome: Ongoing, Progressing   Goal Outcome Evaluation:

## 2022-04-06 NOTE — PLAN OF CARE
Occupational Therapy Discharge Summary    Reason for therapy discharge:    All goals and outcomes met, no further needs identified.    Progress towards therapy goal(s). See goals on Care Plan in [facility] electronic health record for goal details.  Goals met    Therapy recommendation(s):    No further therapy is recommended.

## 2022-04-06 NOTE — PROGRESS NOTES
04/06/22 0850   Quick Adds   Type of Visit Initial PT Evaluation       Present yes   Language Hmong  (language line)   Living Environment   People in Home child(rey), adult  (2 sons)   Current Living Arrangements house  ([facility])   Home Accessibility stairs to enter home;stairs within home   Number of Stairs, Main Entrance 2   Stair Railings, Main Entrance railings safe and in good condition   Number of Stairs, Within Home, Primary greater than 10 stairs   Stair Railings, Within Home, Primary railings safe and in good condition   Transportation Anticipated family or friend will provide   Living Environment Comments bedroom upstairs   Self-Care   Equipment Currently Used at Home none   Activity/Exercise/Self-Care Comment independent ADL;s/IADL's   General Information   Onset of Illness/Injury or Date of Surgery 04/05/22   Referring Physician Dr. James   Patient/Family Therapy Goals Statement (PT) none stated   Pertinent History of Current Problem (include personal factors and/or comorbidities that impact the POC) s/p L4-5 post. fusion 4/5/22; PMH of DM, HTN, PAF   Existing Precautions/Restrictions brace worn when out of bed;spinal   Cognition   Affect/Mental Status (Cognition) WFL   Orientation Status (Cognition) oriented x 4   Range of Motion (ROM)   Range of Motion ROM is WFL   Strength (Manual Muscle Testing)   Strength (Manual Muscle Testing) strength is WFL   Bed Mobility   Bed Mobility supine-sit   Supine-Sit Sully (Bed Mobility) moderate assist (50% patient effort);verbal cues;nonverbal cues (demo/gesture)   Assistive Device (Bed Mobility) bed rails   Comment, (Bed Mobility) cuing for log roll technique; instruction in spinal precautions   Transfers   Transfers sit-stand transfer   Sit-Stand Transfer   Sit-Stand Sully (Transfers) contact guard;verbal cues;nonverbal cues (demo/gesture)   Assistive Device (Sit-Stand Transfers) walker, front-wheeled   Gait/Stairs  (Locomotion)   Mayes Level (Gait) contact guard;verbal cues;supervision   Assistive Device (Gait) walker, front-wheeled   Distance in Feet (Required for LE Total Joints) 100   Pattern (Gait) step-through   Deviations/Abnormal Patterns (Gait) gait speed decreased;stride length decreased   Sensory Examination   Sensory Perception Comments pt reports numbness L lateroanterior thigh   Clinical Impression   Criteria for Skilled Therapeutic Intervention Yes, treatment indicated   PT Diagnosis (PT) impaired functional mobility   Influenced by the following impairments decreased activity tolerance, pain; decreased sensation   Functional limitations due to impairments bed mobility, transfers, gait, stairs   Clinical Presentation (PT Evaluation Complexity) Stable/Uncomplicated   Clinical Presentation Rationale Pt presents as medically diagnosed   Clinical Decision Making (Complexity) moderate complexity   Planned Therapy Interventions (PT) balance training;bed mobility training;gait training;home exercise program;patient/family education;stair training;strengthening;stretching;transfer training   Anticipated Equipment Needs at Discharge (PT) walker, rolling   Risk & Benefits of therapy have been explained evaluation/treatment results reviewed;patient   PT Discharge Planning   PT Discharge Recommendation (DC Rec) home with assist;home with home care physical therapy   PT Rationale for DC Rec family can assist with ADL's; needs assist for bed mobility, sba amb. with rw   Plan of Care Review   Plan of Care Reviewed With patient   Total Evaluation Time   Total Evaluation Time (Minutes) 15   Physical Therapy Goals   PT Frequency 2x/day   PT Predicated Duration/Target Date for Goal Attainment 04/09/22   PT Goals Bed Mobility;Transfers;Gait;Stairs   PT: Bed Mobility Supervision/stand-by assist;Supine to/from sit   PT: Transfers Supervision/stand-by assist;Assistive device;Sit to/from stand   PT: Gait Supervision/stand-by  assist;Rolling walker;150 feet   PT: Stairs Minimal assist;8 stairs;Rail on left;Rail on right

## 2022-04-07 ENCOUNTER — APPOINTMENT (OUTPATIENT)
Dept: PHYSICAL THERAPY | Facility: HOSPITAL | Age: 67
DRG: 455 | End: 2022-04-07
Attending: ORTHOPAEDIC SURGERY
Payer: COMMERCIAL

## 2022-04-07 VITALS
WEIGHT: 181.5 LBS | OXYGEN SATURATION: 95 % | RESPIRATION RATE: 18 BRPM | TEMPERATURE: 98.1 F | SYSTOLIC BLOOD PRESSURE: 110 MMHG | DIASTOLIC BLOOD PRESSURE: 67 MMHG | HEART RATE: 68 BPM | BODY MASS INDEX: 34.29 KG/M2

## 2022-04-07 LAB
GLUCOSE BLDC GLUCOMTR-MCNC: 101 MG/DL (ref 70–99)
GLUCOSE BLDC GLUCOMTR-MCNC: 127 MG/DL (ref 70–99)
GLUCOSE BLDC GLUCOMTR-MCNC: 72 MG/DL (ref 70–99)

## 2022-04-07 PROCEDURE — 250N000013 HC RX MED GY IP 250 OP 250 PS 637: Performed by: INTERNAL MEDICINE

## 2022-04-07 PROCEDURE — 97116 GAIT TRAINING THERAPY: CPT | Mod: GP

## 2022-04-07 PROCEDURE — 250N000013 HC RX MED GY IP 250 OP 250 PS 637: Performed by: ORTHOPAEDIC SURGERY

## 2022-04-07 PROCEDURE — 250N000013 HC RX MED GY IP 250 OP 250 PS 637

## 2022-04-07 PROCEDURE — 99233 SBSQ HOSP IP/OBS HIGH 50: CPT | Performed by: INTERNAL MEDICINE

## 2022-04-07 RX ORDER — OXYCODONE HYDROCHLORIDE 5 MG/1
5 TABLET ORAL EVERY 6 HOURS PRN
Qty: 12 TABLET | Refills: 0 | Status: SHIPPED | OUTPATIENT
Start: 2022-04-07 | End: 2023-04-17

## 2022-04-07 RX ORDER — AMOXICILLIN 250 MG
1 CAPSULE ORAL DAILY PRN
Qty: 10 TABLET | Refills: 0 | Status: SHIPPED | OUTPATIENT
Start: 2022-04-07 | End: 2023-04-17

## 2022-04-07 RX ORDER — MULTIVITAMIN,THERAPEUTIC
1 TABLET ORAL DAILY
Qty: 30 TABLET | Refills: 0 | Status: SHIPPED | OUTPATIENT
Start: 2022-04-07 | End: 2023-04-17

## 2022-04-07 RX ADMIN — LORATADINE 10 MG: 10 TABLET ORAL at 08:00

## 2022-04-07 RX ADMIN — DOCUSATE SODIUM 50 MG AND SENNOSIDES 8.6 MG 1 TABLET: 8.6; 5 TABLET, FILM COATED ORAL at 07:58

## 2022-04-07 RX ADMIN — ACETAMINOPHEN 975 MG: 325 TABLET ORAL at 07:58

## 2022-04-07 RX ADMIN — ACETAMINOPHEN 975 MG: 325 TABLET ORAL at 00:43

## 2022-04-07 RX ADMIN — OXYCODONE HYDROCHLORIDE 5 MG: 5 TABLET ORAL at 13:16

## 2022-04-07 RX ADMIN — POLYETHYLENE GLYCOL 3350 17 G: 17 POWDER, FOR SOLUTION ORAL at 07:58

## 2022-04-07 RX ADMIN — OXYCODONE HYDROCHLORIDE 5 MG: 5 TABLET ORAL at 07:57

## 2022-04-07 RX ADMIN — PANTOPRAZOLE SODIUM 40 MG: 40 TABLET, DELAYED RELEASE ORAL at 06:57

## 2022-04-07 RX ADMIN — THERA TABS 1 TABLET: TAB at 07:58

## 2022-04-07 ASSESSMENT — ACTIVITIES OF DAILY LIVING (ADL)
ADLS_ACUITY_SCORE: 16
ADLS_ACUITY_SCORE: 18
ADLS_ACUITY_SCORE: 16
ADLS_ACUITY_SCORE: 18
ADLS_ACUITY_SCORE: 16
ADLS_ACUITY_SCORE: 18
ADLS_ACUITY_SCORE: 18
ADLS_ACUITY_SCORE: 16
ADLS_ACUITY_SCORE: 18
ADLS_ACUITY_SCORE: 16
ADLS_ACUITY_SCORE: 18
ADLS_ACUITY_SCORE: 18
ADLS_ACUITY_SCORE: 16

## 2022-04-07 NOTE — PLAN OF CARE
Physical Therapy Discharge Summary    Reason for therapy discharge:    All goals and outcomes met, no further needs identified.    Progress towards therapy goal(s). See goals on Care Plan in [facility] electronic health record for goal details.  Goals met    Therapy recommendation(s):    Continue home exercise program.

## 2022-04-07 NOTE — PLAN OF CARE
"  Problem: Bowel Motility Impaired (Spinal Surgery)  Goal: Effective Bowel Elimination  Outcome: Ongoing, Progressing     Problem: Plan of Care - These are the overarching goals to be used throughout the patient stay.    Goal: Plan of Care Review/Shift Note  Description: The Plan of Care Review/Shift note should be completed every shift.  The Outcome Evaluation is a brief statement about your assessment that the patient is improving, declining, or no change.  This information will be displayed automatically on your shift note.  Outcome: Ongoing, Progressing  Goal: Patient-Specific Goal (Individualized)  Description: You can add care plan individualizations to a care plan. Examples of Individualization might be:  \"Parent requests to be called daily at 9am for status\", \"I have a hard time hearing out of my right ear\", or \"Do not touch me to wake me up as it startles me\".  Outcome: Ongoing, Progressing  Goal: Absence of Hospital-Acquired Illness or Injury  Outcome: Ongoing, Progressing  Intervention: Identify and Manage Fall Risk  Recent Flowsheet Documentation  Taken 4/7/2022 0800 by Jess Goel RN  Safety Promotion/Fall Prevention:   bed alarm on   chair alarm on  Intervention: Prevent and Manage VTE (Venous Thromboembolism) Risk  Recent Flowsheet Documentation  Taken 4/7/2022 0800 by Jess Goel, RN  VTE Prevention/Management: SCDs (sequential compression devices) off  Activity Management: ambulated to bathroom  Goal: Optimal Comfort and Wellbeing  Outcome: Ongoing, Progressing  Goal: Readiness for Transition of Care  Outcome: Ongoing, Progressing  Pt to discharge home with daughter picking her up. Will discuss paperwork with pt and daughter. Pain meds to be given for car ride home prior to d/c'ing.     Goal Outcome Evaluation:                      "

## 2022-04-07 NOTE — PROGRESS NOTES
Care Management Discharge Note    Discharge Date: 04/07/2022       Discharge Disposition: Home    Discharge Services:  none    Discharge DME:  none    Discharge Transportation: family or friend will provide    Private pay costs discussed: Not applicable    PAS Confirmation Code:    Patient/family educated on Medicare website which has current facility and service quality ratings:      Education Provided on the Discharge Plan:  yes  Persons Notified of Discharge Plans: yes  Patient/Family in Agreement with the Plan:  yes    Handoff Referral Completed: No    Additional Information:  Pt adequate for discharge. No further needs at this time.        Veena Trujillo RN

## 2022-04-07 NOTE — DISCHARGE SUMMARY
[facility]  Hospitalist Discharge Summary      Date of Admission:  4/5/2022  Date of Discharge:  4/7/2022  Discharging Provider: Lalo Galvez MD  Discharge Service: Hospitalist Service    Discharge Diagnoses   Severe L4-L5 central stenosis and spondylosis status post right L4-L5 transforaminal lumbar interbody fusion with laminectomy.      Follow-ups Needed After Discharge   Follow-up Appointments     Follow Up Care      Follow-up with Dr James, with [facility], approximately 6   weeks after your surgery.  You can call [facility] appointment   line at [phone] to schedule.         Follow up with PCP,Dr. Garcia, Brandt within one week   Resume Xarelto on 4/11/22 (6 days post-op) as recommended by orthopedic surgery service.   Unresulted Labs Ordered in the Past 30 Days of this Admission     No orders found from 3/6/2022 to 4/6/2022.          Discharge Disposition   Discharged to home  Condition at discharge: Stable      Hospital Course   Martha Hubbard is a 67-year-old woman with history of type 2 diabetes, hypertension, hyperlipidemia, atrial fibrillation on DOAC and obesity admitted on 4/5/2022 by orthopedic surgery service for elective repair of severe L4-L5 central stenosis and spondylosis. Orthopedic surgeon performed L4-L5 fusion, and L4 laminectomy on 4/5/2022.  Postoperative course was unremarkable. As per orthopedic surgery service, PTA Xarelto for atrial fibrillation should be resumed 6 days post op.   Symptoms have improved and patient is feeling better.  She is clinically stable for discharge at this time.        Consultations This Hospital Stay   CARE MANAGEMENT / SOCIAL WORK IP CONSULT  HOSPITALIST IP CONSULT  SOCIAL WORK IP CONSULT  PHYSICAL THERAPY ADULT IP CONSULT  PHYSICAL THERAPY ADULT IP CONSULT  OCCUPATIONAL THERAPY ADULT IP CONSULT  ORTHOSIS SPINAL IP CONSULT    Code Status   Full Code    Time Spent on this Encounter   I, Lalo Galvez MD,  "personally saw the patient today and spent greater than 30 minutes discharging this patient.       Lalo Galvez MD  [address] [address]  Phone: [phone]  Fax: [phone]  ______________________________________________________________________    Physical Exam   Vital Signs: Temp: 98.1  F (36.7  C) Temp src: Oral BP: 110/67 Pulse: 68   Resp: 18 SpO2: 95 % O2 Device: None (Room air)    Weight: 181 lbs 8 oz  Exam:  Constitutional: Appears stated age, not in any obvious distress, afebrile, anicteric, not dehydrated, alert, and oriented  Head: Normocephalic. No masses, lesions, tenderness or abnormalities  ENT: ENT exam normal, no neck nodes or sinus tenderness  Cardiovascular: RRR. S1 and S2. No murmurs, clicks gallops or rub, No edema or JVD.  No lifts, heaves, or thrills.  Respiratory: No increased work of breathing, good air exchange, clear to auscultation bilaterally, no crackles or wheezing  Back: Surgical wound is clean and dry  Gastrointestinal: Abdomen soft, non-tender. BS normal. No masses, organomegaly  Musculoskeletal: extremities normal- motion   Skin: no suspicious lesions or rashes  Neurologic:  Moving extremities.  Normal speech.  No facial weakness  Psychiatric: mentation appears normal          Primary Care Physician   WINSTON COOK    Discharge Orders      Reason for your hospital stay    Low back fusion     When to call - Contact Surgeon Team    You may experience symptoms that require follow-up before your scheduled appointment. Refer to the \"Stoplight Tool\" for instructions on when to contact your Surgeon Team if you are concerned about pain control, blood clots, constipation, or if you are unable to urinate.     When to call - Reach out to Urgent Care    If you are not able to reach your Surgeon Team and you need immediate care, go to the Orthopedic Walk-in Clinic or Urgent Care at your Surgeon's office.  Do NOT go to the " Emergency Room unless you have shortness of breath, chest pain, or other signs of a medical emergency.     When to call - Reasons to Call 911    Call 911 immediately if you experience sudden-onset chest pain, arm weakness/numbness, slurred speech, or shortness of breath     Discharge Instruction - Breathing exercises    Perform breathing exercises using your Incentive Spirometer 10 times per hour while awake for 2 weeks.     Symptoms - Fever Management    A low grade fever can be expected after surgery.  Use acetaminophen (TYLENOL) as needed for fever management.  Contact your Surgeon Team if you have a fever greater than 101.5 F, chills, and/or night sweats.     Symptoms - Constipation management    Constipation (hard, dry bowel movements) is expected after surgery due to the combination of being less active, the anesthetic, and the opioid pain medication.  You can do the following to help reduce constipation:  ~  FLUIDS:  Drink clear liquids (water or Gatorade), or juice (apple/prune).  ~  DIET:  Eat a fiber rich diet.    ~  ACTIVITY:  Get up and move around several times a day.  Increase your activity as you are able.  MEDICATIONS:  Reduce the risk of constipation by starting medications before you are constipated.  You can take Miralax   (1 packet as directed) and/or a stool softener (Senokot 1-2 tablets 1-2 times a day).  If you already have constipation and these medications are not working, you can get magnesium citrate and use as directed.  If you continue to have constipation you can try an over the counter suppository or enema.  Call your Surgeon Team if it has been greater than 3 days since your last bowel movement.     Symptoms - Reduced Urine Output    Changes in the amount of fluids you drank before and after surgery may result in problems urinating.  It is important to stay well-hydrated after surgery and drink plenty of water. If it has been greater than 8 hours since you have urinated despite  drinking plenty of water, call your Surgeon Team.     Activity - Exercises to prevent blood clots    Unless otherwise directed by your Surgeon team, perform the following exercises at least three times per day for the first four weeks after surgery to prevent blood clots in your legs: 1) Point and flex your feet (Ankle Pumps), 2) Move your ankle around in big circles, 3) Wiggle your toes, 4) Walk, even for short distances, several times a day, will help decrease the risk of blood clots.     Comfort and Pain Management - Pain after Surgery    Pain after surgery is normal and expected.  You will have some amount of pain for several weeks after surgery.  Your pain will improve with time.  There are several things you can do to help reduce your pain including: rest, ice, elevation, and using pain medications as needed. Contact your Surgeon Team if you have pain that persists or worsens after surgery despite rest, ice, elevation, and taking your medication(s) as prescribed. Contact your Surgeon Team if you have new numbness, tingling, or weakness in your operative extremity.     Comfort and Pain Management - Swelling after Surgery    Swelling and/or bruising of the surgical extremity is common and may persist for several months after surgery. In addition to frequent icing and elevation, gentle compressive support with an ACE wrap or tubigrip may help with swelling. Apply compression regularly, removing at least twice daily to perform skin checks. Contact your Surgeon Team if your swelling increases and is NOT associated with an increase in your activity level, or if your swelling increases and is associated with redness and pain.     Comfort and Pain Management - Cold therapy    Ice can be used to control swelling and discomfort after surgery. Place a thin towel over your operative site and apply the ice pack overtop. Leave ice pack in place for 20 minutes, then remove for 20 minutes. Repeat this 20 minutes on/20 minutes  off routine as often as tolerated.     Medication Instructions - Acetaminophen (TYLENOL) Instructions    You were discharged with acetaminophen (TYLENOL) for pain management after surgery. Acetaminophen most effectively manages pain symptoms when it is taken on a schedule without missing doses (every four, six, or eight hours). Your Provider will prescribe a safe daily dose between 3000 - 4000 mg.  Do NOT exceed this daily dose. Most patients use acetaminophen for pain control for the first four weeks after surgery.  You can wean from this medication as your pain decreases.     Medication Instructions - Opioids - Tapering Instructions    In the first three days following surgery, your symptoms may warrant use of the narcotic pain medication every four to six hours as prescribed. This is normal. As your pain symptoms improve, focus your efforts on decreasing (tapering) use of narcotic medications. The most successful tapering strategy is to first, decrease the number of tablets you take every 4-6 hours to the minimum prescribed. Then, increase the amount of time between doses.  For example:  First, taper to   or 1 tablet every 4-6 hours.  Then, taper to   or 1 tablet every 6-8 hours.  Then, taper to   or 1 tablet every 8-10 hours.  Then, taper to   or 1 tablet every 10-12 hours.  Then, taper to   or 1 tablet at bedtime.  The bedtime dose can help with comfort during sleep and is typically the last dose to be discontinued after surgery.     Follow Up Care    Follow-up with Dr James, with [facility], approximately 6 weeks after your surgery.  You can call [facility] appointment line at [phone] to schedule.     Comfort and Pain Management - NO Extremity Elevation    Do NOT elevate your operative extremity.     Medication Instructions - Opioid Instructions (1 - 2 tablets Q 4-6 hours, MAX 6 tablets)    You were discharged with an opioid medication (hydromorphone, oxycodone, hydrocodone, or  tramadol). This medication should only be taken for breakthrough pain that is not controlled with acetaminophen (TYLENOL). If you rate your pain less than 3 you do not need this medication.  Pain rating 0-3:  You do not need this medication.  Pain rating 4-6:  Take 1 tablet every 4-6 hours as needed  Pain rating 7-10:  Take 2 tablets every 4-6 hours as needed.  Do not exceed 6 tablets per day     Brief Discharge Instructions    Keep your surgical wound covered and dry for 1 week after surgery.  After that time, it is okay to leave the wound uncovered if there is no drainage present.  For bathing, it is okay for you to shower but do not submerge the incision.  Again, keep the wound covered with a waterproof dressing for the first week and then it can get wet.     Discharge Instruction - Regular Diet Adult    Return to your pre-surgery diet unless instructed otherwise       Significant Results and Procedures   Results for orders placed or performed during the hospital encounter of 04/05/22   XR Surgery BA Fluoro G/T 5 Min w Stills    Narrative    XR SURGERY BA FLUORO GREATER THAN 5 MIN W STILLS  4/5/2022 11:32 AM    INDICATION: Intraoperative localization.  COMPARISON: MRI 08/05/2021.    FINDINGS:     Fluoroscopy time: 12 seconds.  3D dose: DLP 1315.57 mGycm.    Nomenclature presumes 5 lumbar type vertebral bodies.    Multilevel intraoperative images with final image showing interbody fusion and posterior instrumented fusion at L4-L5.    Please refer to the operative note for a description of intraoperative findings.   XR Hip Portable Right 2-3 Views    Narrative    EXAM: XR HIP PORTABLE RIGHT 2-3 VIEWS  LOCATION: [facility]  DATE/TIME: 4/6/2022 4:06 PM    INDICATION: right hip thigh pain  COMPARISON: None.      Impression    IMPRESSION: Right hip joint space maintained. No fracture or dislocation.    L4/L5 lumbar fusion changes.        Discharge Medications   Current Discharge Medication  List      START taking these medications    Details   multivitamin, therapeutic (THERA-VIT) TABS tablet Take 1 tablet by mouth daily  Qty: 30 tablet, Refills: 0    Associated Diagnoses: S/P lumbar fusion; Morbid obesity (H); Chronic atrial fibrillation (H); Hyperlipidemia LDL goal <100; Essential hypertension      oxyCODONE (ROXICODONE) 5 MG tablet Take 1 tablet (5 mg) by mouth every 6 hours as needed for breakthrough pain or moderate to severe pain  Qty: 12 tablet, Refills: 0    Associated Diagnoses: S/P lumbar fusion; Morbid obesity (H); Chronic atrial fibrillation (H); Hyperlipidemia LDL goal <100; Essential hypertension      senna-docusate (SENOKOT-S/PERICOLACE) 8.6-50 MG tablet Take 1 tablet by mouth daily as needed for constipation  Qty: 10 tablet, Refills: 0    Associated Diagnoses: S/P lumbar fusion; Morbid obesity (H); Chronic atrial fibrillation (H); Hyperlipidemia LDL goal <100; Essential hypertension         CONTINUE these medications which have NOT CHANGED    Details   atorvastatin (LIPITOR) 40 MG tablet Take 40 mg by mouth At Bedtime      Calcium Carbonate-Vitamin D 500-50 MG-UNIT CAPS Take 1 capsule by mouth daily      cyclobenzaprine (FLEXERIL) 5 MG tablet Take 5 mg by mouth 3 times daily as needed for muscle spasms      ketotifen (ZADITOR) 0.025 % ophthalmic solution Place 1 drop into both eyes 2 times daily as needed       loratadine (CLARITIN) 10 MG tablet Take 10 mg by mouth daily      losartan (COZAAR) 25 MG tablet Take 25 mg by mouth daily      omeprazole (PRILOSEC) 40 MG DR capsule Take 40 mg by mouth daily      polyvinyl alcohol (LIQUIFILM TEARS) 1.4 % ophthalmic solution Place 1 drop into both eyes as needed for dry eyes      rivaroxaban ANTICOAGULANT (XARELTO) 20 MG TABS tablet Take 20 mg by mouth daily      vitamin D3 (CHOLECALCIFEROL) 50 mcg (2000 units) tablet Take 50 mcg by mouth daily            Allergies   Allergies   Allergen Reactions     Gabapentin Rash

## 2022-04-07 NOTE — PLAN OF CARE
Problem: Bleeding (Spinal Surgery)  Goal: Absence of Bleeding  Outcome: Ongoing, Progressing     Problem: Neurologic Impairment (Spinal Surgery)  Goal: Optimal Neurologic Function  Outcome: Ongoing, Progressing     Problem: Pain (Spinal Surgery)  Goal: Acceptable Pain Control  Outcome: Ongoing, Progressing   Goal Outcome Evaluation:                Pt slept all night,denied pain,continuous c/o left thigh/hip area numbness,sticky note left for MD,Vs  stable. Will continue to monitor.

## 2022-04-07 NOTE — DISCHARGE SUMMARY
Martha Hubbard,  1955, MRN [id number]    Admission Date: 2022  Admission Diagnoses: Spinal stenosis, lumbar [M48.061]  Spondylolisthesis [M43.10]  S/P lumbar fusion [Z98.1]     Discharge Date:      Post-operative Day:  2 Days Post-Op    Reason for Admission: The patient was admitted for the following:  Lumbar fusion    Brief Hospital Course: This 67 year old female underwent the aforementioned procedure with Dr. James. There were no intraoperative complications and the patient was transferred to the recovery room and later the orthopedic unit in stable condition. Once the patient reached the orthopedic floor our orthopedic pain protocol was implemented along with the following:  Therapy: PT/OT  Anticoagulation Medications: Start Xarelto on Day 6 post-op     Complications during admission: None  Consultations during admission: Hospitalist service for medical management     Pertinent Results at Discharge:    Hemoglobin   Date/Time Value Ref Range Status   2022 06:06 AM 12.3 11.7 - 15.7 g/dL Final   2022 10:34 AM 13.3 11.7 - 15.7 g/dL Final   2018 09:18 PM 14.9 12.0 - 16.0 g/dL Final     Platelet Count   Date/Time Value Ref Range Status   2022 06:06  150 - 450 10e3/uL Final   2022 10:34  150 - 450 10e3/uL Final   2018 09:18  140 - 440 thou/uL Final     /67 (BP Location: Right arm)   Pulse 68   Temp 98.1  F (36.7  C) (Oral)   Resp 18   Wt 82.3 kg (181 lb 8 oz)   SpO2 95%   BMI 34.29 kg/m      Active Problems:    S/P lumbar fusion      Discharge Information:  Condition at discharge: good  Discharge destination: Home    Medications at discharge:      Medication List      Started    multivitamin, therapeutic Tabs tablet  1 tablet, Oral, DAILY     oxyCODONE 5 MG tablet  Commonly known as: ROXICODONE  5 mg, Oral, EVERY 6 HOURS PRN     senna-docusate 8.6-50 MG tablet  Commonly known as: SENOKOT-S/PERICOLACE  1 tablet, Oral, DAILY PRN             Activity: WBAT    Follow-up Care:  The patient will be followed in our office in 2 weeks or sooner should the need arise.  Patient should follow up with their PCP as directed.  Patient was seen by myself on the date of discharge.    Robert Pederson PA-C    Date: 4/7/2022  Time: 9:49 AM

## 2022-04-07 NOTE — PLAN OF CARE
Problem: Plan of Care - These are the overarching goals to be used throughout the patient stay.    Goal: Absence of Hospital-Acquired Illness or Injury  Outcome: Ongoing, Progressing  Intervention: Identify and Manage Fall Risk  Recent Flowsheet Documentation  Taken 4/6/2022 1810 by Lester Soriano RN  Safety Promotion/Fall Prevention:   activity supervised   increase visualization of patient   lighting adjusted   mobility aid in reach   nonskid shoes/slippers when out of bed   patient and family education   safety round/check completed     Problem: Plan of Care - These are the overarching goals to be used throughout the patient stay.    Goal: Optimal Comfort and Wellbeing  Outcome: Ongoing, Progressing     Problem: Bleeding (Spinal Surgery)  Goal: Absence of Bleeding  Outcome: Ongoing, Progressing     Problem: Bowel Motility Impaired (Spinal Surgery)  Goal: Effective Bowel Elimination  Outcome: Ongoing, Not Progressing     Problem: Functional Ability Impaired (Spinal Surgery)  Goal: Optimal Functional Ability  Outcome: Ongoing, Progressing     Problem: Pain (Spinal Surgery)  Goal: Acceptable Pain Control  Outcome: Ongoing, Progressing     Problem: Postoperative Nausea and Vomiting (Spinal Surgery)  Goal: Nausea and Vomiting Relief  Outcome: Ongoing, Progressing     Problem: Postoperative Urinary Retention (Spinal Surgery)  Goal: Effective Urinary Elimination  Outcome: Ongoing, Progressing     Problem: Respiratory Compromise (Spinal Surgery)  Goal: Effective Oxygenation and Ventilation  Outcome: Ongoing, Progressing     Pt alert and oriented. Pt's daughter was present during cares and was able to interpret for pt. Pt c/o 6/10 back pain. Pt was given scheduled acetaminophen at 1809, and PRN oxycodone 5 mg at 2019 and PRN methocarbamol at 2030. Refused ice pack. Surgical dressing was clean, dry, and intact. Pt continues to have numbness to left lateral thigh. MD aware per AM nurse. Pt A1 w/ sitting up from bed, but SBA  when ambulating. Pt ambulates to bathroom and is continent of her bladder. Pt voiding w/out difficulty. BS present and pt passing flatus. No BM thus far. Pt tolerating regular diet. Pt takes meds whole w/ water. Pre-prandial BG of 99 and HS BG of 213. Pt utilizing and able to use corset brace when up from bed. Call light is within reach.

## 2023-04-17 ENCOUNTER — OFFICE VISIT (OUTPATIENT)
Dept: CARDIOLOGY | Facility: CLINIC | Age: 68
End: 2023-04-17
Payer: COMMERCIAL

## 2023-04-17 VITALS
WEIGHT: 173 LBS | HEART RATE: 72 BPM | RESPIRATION RATE: 14 BRPM | SYSTOLIC BLOOD PRESSURE: 146 MMHG | DIASTOLIC BLOOD PRESSURE: 88 MMHG | BODY MASS INDEX: 32.69 KG/M2

## 2023-04-17 DIAGNOSIS — I10 BENIGN ESSENTIAL HYPERTENSION: ICD-10-CM

## 2023-04-17 DIAGNOSIS — I10 BENIGN ESSENTIAL HYPERTENSION: Primary | ICD-10-CM

## 2023-04-17 PROCEDURE — 99214 OFFICE O/P EST MOD 30 MIN: CPT | Performed by: INTERNAL MEDICINE

## 2023-04-17 RX ORDER — LOSARTAN POTASSIUM 50 MG/1
50 TABLET ORAL DAILY
Qty: 30 TABLET | Refills: 11 | Status: SHIPPED | OUTPATIENT
Start: 2023-04-17 | End: 2023-04-19

## 2023-04-17 NOTE — LETTER
4/17/2023    WINSTON COOK MD  [facility] [address]  [facility] [address]    RE: Martha HOWARD Her       Dear Colleague,     I had the pleasure of seeing Martha Hubbard in the [facility].    HEART CARE ENCOUNTER CONSULTATON NOTE      RAD[facility]  [phone]      Assessment/Recommendations   Assessment/Plan:  Paroxysmal atrial fibrillation - on xarelto with prn metoprolol with limited symptoms    Hypertension - titrate losartan to 50mg daily. Will have her come in for a BP check and labs in 1 month    Risk modification - diet and exercise    F/U 12 months with me, 1 month with nurse for BP check    Professional  used via video visit     History of Present Illness/Subjective    HPI: Martha Hubbard is a 68 year old female with paroxysmal atrial fibrillation, diabetes, hypertension, hyperlipidemia, GERD and obesity. An exercise nuclear stress test was negative for ischemia or infarct 7/2016. ABIs were normal 3/2017.  Her atrial fibrillation was diagnosed 10/2017 and metoprolol and xarelto were started and an echo was essentially normal. 9/2018 she presented in afib with RVR to the ER and no medications changes were made. TSH was normal 2/2019. Nuclear stress was again negative 5/2021 and a holter showed only NSR; metoprolol was switched to prn.     Ms Her returns for follow up visit today. She notes her BP has been elevated for the past year and thinks it is related to decreased exercise from lower back pain. She has a MRI scheduled for tomorrow to look at her lumbar spine. There has been no chest pain, dyspnea, dizziness, syncope, edema. She notes rare, brief palpitations.        Physical Examination  Review of Systems   Vitals: BP (!) 146/88 (BP Location: Left arm, Patient Position: Sitting, Cuff Size: Adult Regular)   Pulse 72   Resp 14   Wt 78.5 kg (173 lb)   BMI 32.69 kg/m    BMI= Body mass index is 32.69 kg/m .  Wt Readings from Last 3 Encounters:   04/17/23  78.5 kg (173 lb)   04/05/22 82.3 kg (181 lb 8 oz)   07/05/21 83.9 kg (185 lb)       General Appearance:   no distress, overweight body habitus   ENT/Mouth: membranes moist, no oral lesions or bleeding gums.      EYES:  no scleral icterus, normal conjunctivae   Neck: no carotid bruits or thyromegaly   Chest/Lungs:   lungs are clear to auscultation, no rales or wheezing, no sternal scar, equal chest wall expansion    Cardiovascular:   Regular. Normal first and second heart sounds with no murmur. No rubs or gallops; the right carotid, radial and posterior tibial pulses are intact and the left carotid, radial and posterior tibial pulses are intact.  Jugular venous pressure is flat, no edema bilaterally    Abdomen:  no organomegaly, masses, bruits, or tenderness; bowel sounds are present   Extremities: no cyanosis or clubbing   Skin: no xanthelasma, warm.    Neurologic: normal  bilateral, no tremors     Psychiatric: alert and oriented x3, calm        Please refer above for cardiac ROS details.        Medical History  Surgical History Family History Social History   Past Medical History:   Diagnosis Date    Allergic rhinitis     Atrial fibrillation (H)     Claudication in peripheral vascular disease (H)     Diabetes mellitus (H)     Headache     Hip pain     Hyperlipidemia     Hypertension     Muscle weakness     Myalgia     Numbness of hand     Obesity     Osteoarthritis     Spinal stenosis     lumbar    Vitamin D deficiency      Past Surgical History:   Procedure Laterality Date    FUSION TRANSFORAMINAL LUMBAR INTERBODY, 1 LEVEL, POSTERIOR APPROACH, USING OTS SURG IMAGING GUIDANCE Right 4/5/2022    Procedure: RIGHT LUMBAR 4-5 TRANSFORAMINAL LUMBAR INTERBODY FUSION WITH LAMINECTOMY USING STEALTH NAVIGATION;  Surgeon: Brandon James MD;  Location: [facility] [place]     No family history on file.     Social History     Socioeconomic History    Marital status:      Spouse name: Not on file    Number of  children: Not on file    Years of education: Not on file    Highest education level: Not on file   Occupational History    Not on file   Tobacco Use    Smoking status: Never    Smokeless tobacco: Never   Vaping Use    Vaping status: Not on file   Substance and Sexual Activity    Alcohol use: No    Drug use: No    Sexual activity: Not on file   Other Topics Concern    Not on file   Social History Narrative    Not on file     Social Determinants of Health     Financial Resource Strain: Not on file   Food Insecurity: Not on file   Transportation Needs: Not on file   Physical Activity: Not on file   Stress: Not on file   Social Connections: Not on file   Intimate Partner Violence: Not on file   Housing Stability: Not on file           Medications  Allergies   Current Outpatient Medications   Medication Sig Dispense Refill    atorvastatin (LIPITOR) 40 MG tablet Take 40 mg by mouth At Bedtime      Calcium Carbonate-Vitamin D 500-50 MG-UNIT CAPS Take 1 capsule by mouth daily      ketotifen (ZADITOR) 0.025 % ophthalmic solution Place 1 drop into both eyes 2 times daily as needed       losartan (COZAAR) 25 MG tablet Take 25 mg by mouth daily      omeprazole (PRILOSEC) 40 MG DR capsule Take 40 mg by mouth daily      polyvinyl alcohol (LIQUIFILM TEARS) 1.4 % ophthalmic solution Place 1 drop into both eyes as needed for dry eyes      rivaroxaban ANTICOAGULANT (XARELTO) 20 MG TABS tablet Take 1 tablet (20 mg) by mouth daily 30 tablet 0    vitamin D3 (CHOLECALCIFEROL) 50 mcg (2000 units) tablet Take 50 mcg by mouth daily          Allergies   Allergen Reactions    Metoclopramide Dizziness    Gabapentin Rash          Lab Results    Chemistry/lipid CBC Cardiac Enzymes/BNP/TSH/INR   Recent Labs   Lab Test 03/20/17  0949   CHOL 177   HDL 47*   LDL 84   TRIG 228*     Recent Labs   Lab Test 03/20/17  0949   LDL 84     Recent Labs   Lab Test 04/07/22  1105 04/06/22  0826 04/06/22  0606   NA  --   --  139   POTASSIUM  --   --  4.4    CHLORIDE  --   --  105   CO2  --   --  24   *   < > 118   BUN  --   --  17   CR  --   --  0.88   GFRESTIMATED  --   --  72   ROBBIN  --   --  9.3    < > = values in this interval not displayed.     Recent Labs   Lab Test 04/06/22  0606 04/05/22  1034 09/16/18  2118   CR 0.88 0.88 1.02     Recent Labs   Lab Test 04/05/22  1034   A1C 6.0*          Recent Labs   Lab Test 04/06/22 0606   WBC 15.7*   HGB 12.3   HCT 37.2   MCV 94        Recent Labs   Lab Test 04/06/22  0606 04/05/22  1034 09/16/18  2118   HGB 12.3 13.3 14.9    No results for input(s): TROPONINI in the last 49100 hours.  No results for input(s): BNP, NTBNPI, NTBNP in the last 42677 hours.  No results for input(s): TSH in the last 75825 hours.  No results for input(s): INR in the last 69310 hours.     Today's clinic visit entailed:  Review of prior external note(s) from - [facility] information from epic reviewed  40 minutes spent by me on the date of the encounter doing chart review, review of outside records, review of test results, interpretation of tests, patient visit and documentation   Provider  Link to [facility] Help Grid     The level of medical decision making during this visit was of moderate complexity.        Nia Gonzalez MD                Thank you for allowing me to participate in the care of your patient.      Sincerely,     Nia Gonzalez MD     [facility]  cc:   No referring provider defined for this encounter.

## 2023-04-17 NOTE — PROGRESS NOTES
HEART CARE ENCOUNTER CONSULTATON NOTE      [facility]  [phone]      Assessment/Recommendations   Assessment/Plan:  Paroxysmal atrial fibrillation - on xarelto with prn metoprolol with limited symptoms    Hypertension - titrate losartan to 50mg daily. Will have her come in for a BP check and labs in 1 month    Risk modification - diet and exercise    F/U 12 months with me, 1 month with nurse for BP check    Professional  used via video visit     History of Present Illness/Subjective    HPI: Martha Hubbard is a 68 year old female with paroxysmal atrial fibrillation, diabetes, hypertension, hyperlipidemia, GERD and obesity. An exercise nuclear stress test was negative for ischemia or infarct 7/2016. ABIs were normal 3/2017.  Her atrial fibrillation was diagnosed 10/2017 and metoprolol and xarelto were started and an echo was essentially normal. 9/2018 she presented in afib with RVR to the ER and no medications changes were made. TSH was normal 2/2019. Nuclear stress was again negative 5/2021 and a holter showed only NSR; metoprolol was switched to prn.     Ms Her returns for follow up visit today. She notes her BP has been elevated for the past year and thinks it is related to decreased exercise from lower back pain. She has a MRI scheduled for tomorrow to look at her lumbar spine. There has been no chest pain, dyspnea, dizziness, syncope, edema. She notes rare, brief palpitations.        Physical Examination  Review of Systems   Vitals: BP (!) 146/88 (BP Location: Left arm, Patient Position: Sitting, Cuff Size: Adult Regular)   Pulse 72   Resp 14   Wt 78.5 kg (173 lb)   BMI 32.69 kg/m    BMI= Body mass index is 32.69 kg/m .  Wt Readings from Last 3 Encounters:   04/17/23 78.5 kg (173 lb)   04/05/22 82.3 kg (181 lb 8 oz)   07/05/21 83.9 kg (185 lb)       General Appearance:   no distress, overweight body habitus   ENT/Mouth: membranes moist, no oral lesions or bleeding gums.       EYES:  no scleral icterus, normal conjunctivae   Neck: no carotid bruits or thyromegaly   Chest/Lungs:   lungs are clear to auscultation, no rales or wheezing, no sternal scar, equal chest wall expansion    Cardiovascular:   Regular. Normal first and second heart sounds with no murmur. No rubs or gallops; the right carotid, radial and posterior tibial pulses are intact and the left carotid, radial and posterior tibial pulses are intact.  Jugular venous pressure is flat, no edema bilaterally    Abdomen:  no organomegaly, masses, bruits, or tenderness; bowel sounds are present   Extremities: no cyanosis or clubbing   Skin: no xanthelasma, warm.    Neurologic: normal  bilateral, no tremors     Psychiatric: alert and oriented x3, calm        Please refer above for cardiac ROS details.        Medical History  Surgical History Family History Social History   Past Medical History:   Diagnosis Date     Allergic rhinitis      Atrial fibrillation (H)      Claudication in peripheral vascular disease (H)      Diabetes mellitus (H)      Headache      Hip pain      Hyperlipidemia      Hypertension      Muscle weakness      Myalgia      Numbness of hand      Obesity      Osteoarthritis      Spinal stenosis     lumbar     Vitamin D deficiency      Past Surgical History:   Procedure Laterality Date     FUSION TRANSFORAMINAL LUMBAR INTERBODY, 1 LEVEL, POSTERIOR APPROACH, USING OTS SURG IMAGING GUIDANCE Right 4/5/2022    Procedure: RIGHT LUMBAR 4-5 TRANSFORAMINAL LUMBAR INTERBODY FUSION WITH LAMINECTOMY USING STEALTH NAVIGATION;  Surgeon: Brandon James MD;  Location: [facility] OR     No family history on file.     Social History     Socioeconomic History     Marital status:      Spouse name: Not on file     Number of children: Not on file     Years of education: Not on file     Highest education level: Not on file   Occupational History     Not on file   Tobacco Use     Smoking status: Never     Smokeless  tobacco: Never   Vaping Use     Vaping status: Not on file   Substance and Sexual Activity     Alcohol use: No     Drug use: No     Sexual activity: Not on file   Other Topics Concern     Not on file   Social History Narrative     Not on file     Social Determinants of Health     Financial Resource Strain: Not on file   Food Insecurity: Not on file   Transportation Needs: Not on file   Physical Activity: Not on file   Stress: Not on file   Social Connections: Not on file   Intimate Partner Violence: Not on file   Housing Stability: Not on file           Medications  Allergies   Current Outpatient Medications   Medication Sig Dispense Refill     atorvastatin (LIPITOR) 40 MG tablet Take 40 mg by mouth At Bedtime       Calcium Carbonate-Vitamin D 500-50 MG-UNIT CAPS Take 1 capsule by mouth daily       ketotifen (ZADITOR) 0.025 % ophthalmic solution Place 1 drop into both eyes 2 times daily as needed        losartan (COZAAR) 25 MG tablet Take 25 mg by mouth daily       omeprazole (PRILOSEC) 40 MG DR capsule Take 40 mg by mouth daily       polyvinyl alcohol (LIQUIFILM TEARS) 1.4 % ophthalmic solution Place 1 drop into both eyes as needed for dry eyes       rivaroxaban ANTICOAGULANT (XARELTO) 20 MG TABS tablet Take 1 tablet (20 mg) by mouth daily 30 tablet 0     vitamin D3 (CHOLECALCIFEROL) 50 mcg (2000 units) tablet Take 50 mcg by mouth daily          Allergies   Allergen Reactions     Metoclopramide Dizziness     Gabapentin Rash          Lab Results    Chemistry/lipid CBC Cardiac Enzymes/BNP/TSH/INR   Recent Labs   Lab Test 03/20/17  0949   CHOL 177   HDL 47*   LDL 84   TRIG 228*     Recent Labs   Lab Test 03/20/17  0949   LDL 84     Recent Labs   Lab Test 04/07/22  1105 04/06/22  0826 04/06/22  0606   NA  --   --  139   POTASSIUM  --   --  4.4   CHLORIDE  --   --  105   CO2  --   --  24   *   < > 118   BUN  --   --  17   CR  --   --  0.88   GFRESTIMATED  --   --  72   ROBBIN  --   --  9.3    < > = values in this  interval not displayed.     Recent Labs   Lab Test 04/06/22  0606 04/05/22  1034 09/16/18  2118   CR 0.88 0.88 1.02     Recent Labs   Lab Test 04/05/22  1034   A1C 6.0*          Recent Labs   Lab Test 04/06/22 0606   WBC 15.7*   HGB 12.3   HCT 37.2   MCV 94        Recent Labs   Lab Test 04/06/22  0606 04/05/22  1034 09/16/18  2118   HGB 12.3 13.3 14.9    No results for input(s): TROPONINI in the last 21285 hours.  No results for input(s): BNP, NTBNPI, NTBNP in the last 77063 hours.  No results for input(s): TSH in the last 55339 hours.  No results for input(s): INR in the last 60558 hours.     Today's clinic visit entailed:  Review of prior external note(s) from - [facility] information from epic reviewed  40 minutes spent by me on the date of the encounter doing chart review, review of outside records, review of test results, interpretation of tests, patient visit and documentation   Provider  Link to MDM Help Grid     The level of medical decision making during this visit was of moderate complexity.        Nia Gonzalez MD

## 2023-04-17 NOTE — PATIENT INSTRUCTIONS
It was a pleasure seeing you at [facility] today.        Here are my suggestions for your care:    1. Increase losartan to 50 mg daily to bring down your blood pressure    2. Limit salt intake to 2000 mg per day    3. Avoid ibuprophen (advil, motrin) and naprosyn (aleve). Tylenol is okay.        Let's meet again in 1 month for BP check and 12 months with Dr. Gonzalez.    You can always call my nurse Courtney Pride RN who is a nurse helping me in the care of my patients. She can be reached at [phone] if you have any questions.    For scheduling, please call my  [facility] at [phone].    Thank you again for trusting me with your care. Please feel free to call my office at any time if you have any question or if I can assist you in any way.    Nia Gonzalez MD  [facility]

## 2023-04-19 RX ORDER — LOSARTAN POTASSIUM 50 MG/1
TABLET ORAL
Qty: 90 TABLET | Refills: 3 | Status: SHIPPED | OUTPATIENT
Start: 2023-04-19

## 2023-05-17 ENCOUNTER — LAB (OUTPATIENT)
Dept: CARDIOLOGY | Facility: CLINIC | Age: 68
End: 2023-05-17
Payer: COMMERCIAL

## 2023-05-17 ENCOUNTER — ALLIED HEALTH/NURSE VISIT (OUTPATIENT)
Dept: CARDIOLOGY | Facility: CLINIC | Age: 68
End: 2023-05-17
Payer: COMMERCIAL

## 2023-05-17 VITALS — DIASTOLIC BLOOD PRESSURE: 84 MMHG | SYSTOLIC BLOOD PRESSURE: 138 MMHG | HEART RATE: 56 BPM | OXYGEN SATURATION: 98 %

## 2023-05-17 DIAGNOSIS — I10 BENIGN ESSENTIAL HYPERTENSION: ICD-10-CM

## 2023-05-17 DIAGNOSIS — I10 HTN (HYPERTENSION): Primary | ICD-10-CM

## 2023-05-17 LAB
ANION GAP SERPL CALCULATED.3IONS-SCNC: 11 MMOL/L (ref 7–15)
BUN SERPL-MCNC: 12.7 MG/DL (ref 8–23)
CALCIUM SERPL-MCNC: 10.7 MG/DL (ref 8.8–10.2)
CHLORIDE SERPL-SCNC: 107 MMOL/L (ref 98–107)
CHOLEST SERPL-MCNC: 193 MG/DL
CREAT SERPL-MCNC: 0.77 MG/DL (ref 0.51–0.95)
DEPRECATED HCO3 PLAS-SCNC: 26 MMOL/L (ref 22–29)
GFR SERPL CREATININE-BSD FRML MDRD: 84 ML/MIN/1.73M2
GLUCOSE SERPL-MCNC: 95 MG/DL (ref 70–99)
HDLC SERPL-MCNC: 82 MG/DL
LDLC SERPL CALC-MCNC: 90 MG/DL
NONHDLC SERPL-MCNC: 111 MG/DL
POTASSIUM SERPL-SCNC: 3.9 MMOL/L (ref 3.4–5.3)
SODIUM SERPL-SCNC: 144 MMOL/L (ref 136–145)
TRIGL SERPL-MCNC: 103 MG/DL

## 2023-05-17 PROCEDURE — 80048 BASIC METABOLIC PNL TOTAL CA: CPT

## 2023-05-17 PROCEDURE — 99207 PR NO CHARGE NURSE ONLY: CPT

## 2023-05-17 PROCEDURE — 80061 LIPID PANEL: CPT

## 2023-05-17 PROCEDURE — 36415 COLL VENOUS BLD VENIPUNCTURE: CPT

## 2023-05-17 NOTE — NURSING NOTE
Pt in clinic for lab work, and BP check. CMA reached out to writer for patient questions. Wondering if her HR of 55 is ok. Also, to discuss her blood work.   Informed CMA, to discuss with pt writer will call later to address questions. BP stable. Lab work pending.     With the help of [employer]  PC to daughter Philip, mobile number. No answer, and LVM to return call. CMM,Rn

## 2023-05-18 ENCOUNTER — APPOINTMENT (OUTPATIENT)
Dept: INTERPRETER SERVICES | Facility: CLINIC | Age: 68
End: 2023-05-18
Payer: COMMERCIAL

## 2023-05-18 NOTE — NURSING NOTE
Dr Boswell please review in clinic blood pressure readings. Pt verbalized concerns in reduction of HR from 62-65, to below 60, 55-60 at times.   Pt reports that she is taking her Losartan daily as prescribed.   Any recs? CMM,Rn   __________________________________________________________  With the help of [facility] , phone call to patient. Review of questions regarding HR. Discussion if patient has any symptoms, HR 62-65.  No headache or lightheadedness, but noticed that her HR is running lower around 55. At times, feels more tired, but is able to continue to due her day-to-day activities.

## 2023-05-24 NOTE — NURSING NOTE
Nia Gonzalez MD Murphy, Christian, RN  Let's get a 24 hour holter to see when this is happening.  Encouraged her to be up and active during the day while wearing it.     EG

## 2023-05-31 ENCOUNTER — TELEPHONE (OUTPATIENT)
Dept: CARDIOLOGY | Facility: CLINIC | Age: 68
End: 2023-05-31
Payer: COMMERCIAL

## 2023-05-31 ENCOUNTER — APPOINTMENT (OUTPATIENT)
Dept: INTERPRETER SERVICES | Facility: CLINIC | Age: 68
End: 2023-05-31
Payer: COMMERCIAL

## 2023-05-31 DIAGNOSIS — R00.1 BRADYCARDIA: Primary | ICD-10-CM

## 2023-05-31 NOTE — TELEPHONE ENCOUNTER
===View-only below this line===  ----- Message -----  From: Nia Gonzalez MD  Sent: 5/19/2023  12:27 PM CDT  To: Chase Pride RN    Let's get a 24 hour holter to see when this is happening.  Encouraged her to be up and active during the day while wearing it.     EG

## 2023-05-31 NOTE — TELEPHONE ENCOUNTER
With the help of [facility]  to review follow-up advisement of bradycardia. Discussion of recommendation for heart monitor. Order placed. Pt agreeable. Warm transferred to schedulers to arrange while  on the phone. No further questions at this time. Reminded pt of similar heart monitor worn approximately 2 years ago. Pt recalls, and verbalized understanding. CARLI,RN

## 2023-06-02 ENCOUNTER — HOSPITAL ENCOUNTER (OUTPATIENT)
Dept: CARDIOLOGY | Facility: HOSPITAL | Age: 68
Discharge: HOME | End: 2023-06-02
Attending: INTERNAL MEDICINE | Admitting: INTERNAL MEDICINE
Payer: COMMERCIAL

## 2023-06-02 DIAGNOSIS — R00.1 BRADYCARDIA: ICD-10-CM

## 2023-06-02 PROCEDURE — 93226 XTRNL ECG REC<48 HR SCAN A/R: CPT

## 2023-06-08 PROCEDURE — 93227 XTRNL ECG REC<48 HR R&I: CPT | Performed by: INTERNAL MEDICINE

## 2023-06-14 ENCOUNTER — APPOINTMENT (OUTPATIENT)
Dept: INTERPRETER SERVICES | Facility: CLINIC | Age: 68
End: 2023-06-14
Payer: COMMERCIAL

## 2023-06-28 ENCOUNTER — APPOINTMENT (OUTPATIENT)
Dept: INTERPRETER SERVICES | Facility: CLINIC | Age: 68
End: 2023-06-28
Payer: COMMERCIAL

## 2024-04-30 ENCOUNTER — OFFICE VISIT (OUTPATIENT)
Dept: CARDIOLOGY | Facility: CLINIC | Age: 69
End: 2024-04-30
Payer: COMMERCIAL

## 2024-04-30 VITALS
WEIGHT: 185 LBS | RESPIRATION RATE: 16 BRPM | HEART RATE: 74 BPM | DIASTOLIC BLOOD PRESSURE: 76 MMHG | BODY MASS INDEX: 34.96 KG/M2 | SYSTOLIC BLOOD PRESSURE: 112 MMHG

## 2024-04-30 DIAGNOSIS — I48.20 CHRONIC ATRIAL FIBRILLATION (H): Primary | ICD-10-CM

## 2024-04-30 DIAGNOSIS — R06.09 DOE (DYSPNEA ON EXERTION): ICD-10-CM

## 2024-04-30 PROCEDURE — T1013 SIGN LANG/ORAL INTERPRETER: HCPCS | Mod: U3

## 2024-04-30 PROCEDURE — 99214 OFFICE O/P EST MOD 30 MIN: CPT | Performed by: INTERNAL MEDICINE

## 2024-04-30 NOTE — PROGRESS NOTES
HEART CARE ENCOUNTER NOTE      [facility]  [phone]      Assessment/Recommendations   Assessment:   Paroxysmal atrial fibrillation - on xarelto.  Seems like she was taking it inconsistently but she claims it was just refilled and she is now taking it regularly.  I encouraged her to continue to refill this and take it regularly.  2. Hypertension -on losartan  50mg daily.  Good control.  3.  Hyperlipidemia, on atorvastatin  4.  Dyspnea on exertion.  I think this is mostly deconditioning following her recent back surgery.  Nuclear MPI was normal 2 years ago.  She last had an echo about 7 years ago and I think that this is worth repeating.  If there is a suggestion of RV dysfunction then CT pulmonary angiogram should be obtained to rule out chronic pulmonary emboli.  She is at some risk for this following immobility after her back surgery especially if she was not taking her Xarelto.         Plan:   1.  Continue current medications  2.  Echocardiogram to be scheduled  3.  Plan follow-up in 1 years time           History of Present Illness/Subjective    HPI: Martha Hubbard is a 69 year old female previously followed by my colleague Dr. Gonzalez.  She has transitioned care to me following Dr. Gonzalez's departure.  This is my first visit with her.  She has a history of paroxysmal atrial fibrillation and hypertension.  She has been managed with Xarelto and losartan and was on prn metoprolol for tachycardia, which she has not needed.  She feels well in general but says she has become much more short of breath going up stairs since she had her back surgery.  She still uses a cane to walk and is much less mobile than she was a year ago.  She has been putting on some weight because of her inactivity.  She has not had any tachypalpitations.    Studies reviewed:  Echo: 2017 showed normal LV function.  GXT: Nuclear MPI 2022 no inducible ischemia.    Holter 6/2/2023: Normal sinus rhythm.  No A-fib.  Few  PACs.  No bradycardia.  `  Chest x-ray 3/22/2024:  Aortic calcification noted, otherwise normal.         Physical Examination  Review of Systems   /76 (BP Location: Right arm, Patient Position: Sitting, Cuff Size: Adult Large)   Pulse 74   Resp 16   Wt 83.9 kg (185 lb)   BMI 34.96 kg/m    Body mass index is 34.96 kg/m .  Wt Readings from Last 3 Encounters:   04/30/24 83.9 kg (185 lb)   04/17/23 78.5 kg (173 lb)   04/05/22 82.3 kg (181 lb 8 oz)       General Appearance:   Pleasant elderly female appears stated age. no acute distress, heavyset body habitus   ENT/Mouth: Oropharynx normal    EYES:  no scleral icterus, normal conjunctivae. No eyelid xanthelasma   Neck: No cervical lymphadenopathy  Thyroid not enlarged or nodular  No JVD   Respiratory:   lungs clear , no rales or wheezing, Normal chest wall expansion    Cardiovascular:   Regular rhythm, normal rate. Normal 1st and 2nd heart sounds.  No murmurs, no rubs or gallops;   radial pulses are full and equal   Jugular venous pressure is nonelevated   Abdomen/GI:  No tenderness, no organomegaly, no pulsatile masses. NBS.   Extremities: No cyanosis or clubbing.  No peripheral edema   Skin: No rash or lesions   Heme/lymph/ Immunology No lymphadenopathy, petechiae   Neurologic: Alert and oriented. No focal motor weakness, gait appears normal.       Psychiatric: Pleasant, calm, appropriate affect.          No known hepatic, renal, pulmonary, or CNS disorders. The remainder of the complete ROS is negative except as noted above.         Medical History  Surgical History Family History Social History   Past Medical History:   Diagnosis Date     Allergic rhinitis      Atrial fibrillation (H)      Claudication in peripheral vascular disease (H24)      Diabetes mellitus (H)      Headache      Hip pain      Hyperlipidemia      Hypertension      Muscle weakness      Myalgia      Numbness of hand      Obesity      Osteoarthritis      Spinal stenosis     lumbar      Vitamin D deficiency      Past Surgical History:   Procedure Laterality Date     FUSION TRANSFORAMINAL LUMBAR INTERBODY, 1 LEVEL, POSTERIOR APPROACH, USING OTS SURG IMAGING GUIDANCE Right 4/5/2022    Procedure: RIGHT LUMBAR 4-5 TRANSFORAMINAL LUMBAR INTERBODY FUSION WITH LAMINECTOMY USING STEALTH NAVIGATION;  Surgeon: Brandon James MD;  Location: [facility] Main OR     No family history on file.     Social History     Socioeconomic History     Marital status:      Spouse name: Not on file     Number of children: Not on file     Years of education: Not on file     Highest education level: Not on file   Occupational History     Not on file   Tobacco Use     Smoking status: Never     Smokeless tobacco: Never   Substance and Sexual Activity     Alcohol use: No     Drug use: No     Sexual activity: Not on file   Other Topics Concern     Not on file   Social History Narrative     Not on file     Social Determinants of Health     Financial Resource Strain: Not on file   Food Insecurity: Not on file   Transportation Needs: Not on file   Physical Activity: Not on file   Stress: Not on file   Social Connections: Not on file   Interpersonal Safety: Not on file   Housing Stability: Not on file           Medications  Allergies   Current Outpatient Medications   Medication Sig Dispense Refill     atorvastatin (LIPITOR) 40 MG tablet Take 40 mg by mouth At Bedtime       Calcium Carbonate-Vitamin D 500-50 MG-UNIT CAPS Take 1 capsule by mouth daily       ketotifen (ZADITOR) 0.025 % ophthalmic solution Place 1 drop into both eyes 2 times daily as needed        losartan (COZAAR) 50 MG tablet TAKE 1 TABLET(50 MG) BY MOUTH DAILY 90 tablet 3     omeprazole (PRILOSEC) 40 MG DR capsule Take 40 mg by mouth daily       polyvinyl alcohol (LIQUIFILM TEARS) 1.4 % ophthalmic solution Place 1 drop into both eyes as needed for dry eyes       rivaroxaban ANTICOAGULANT (XARELTO) 20 MG TABS tablet Take 1 tablet (20 mg) by mouth daily  "30 tablet 0     vitamin D3 (CHOLECALCIFEROL) 50 mcg (2000 units) tablet Take 50 mcg by mouth daily          Allergies   Allergen Reactions     Metoclopramide Dizziness     Gabapentin Rash          Lab Results    Chemistry/lipid CBC Cardiac Enzymes/BNP/TSH/INR   Recent Labs   Lab Test 05/17/23  0833   CHOL 193   HDL 82   LDL 90   TRIG 103     Recent Labs   Lab Test 05/17/23  0833 03/20/17  0949   LDL 90 84     Recent Labs   Lab Test 05/17/23  0833      POTASSIUM 3.9   CHLORIDE 107   CO2 26   GLC 95   BUN 12.7   CR 0.77   GFRESTIMATED 84   ROBBIN 10.7*     Recent Labs   Lab Test 05/17/23  0833 04/06/22  0606 04/05/22  1034   CR 0.77 0.88 0.88     Recent Labs   Lab Test 04/05/22  1034   A1C 6.0*          Recent Labs   Lab Test 04/06/22  0606   WBC 15.7*   HGB 12.3   HCT 37.2   MCV 94        Recent Labs   Lab Test 04/06/22  0606 04/05/22  1034 09/16/18  2118   HGB 12.3 13.3 14.9    No results for input(s): \"TROPONINI\" in the last 42766 hours.  No results for input(s): \"BNP\", \"NTBNPI\", \"NTBNP\" in the last 05192 hours.  No results for input(s): \"TSH\" in the last 50196 hours.  No results for input(s): \"INR\" in the last 14141 hours.     Gilbert Mccarthy MD                                    "

## 2024-04-30 NOTE — PATIENT INSTRUCTIONS
No medication changes  You should be taking the Xarelto 20mg/day, lipitor 40mg/day, and losartan 50 mg/day.  Echocardiogram to evaluate shortness of breath with exertion  Follow up 1 year

## 2024-04-30 NOTE — LETTER
4/30/2024    WINSTON COOK MD  [facility] [address]  [facility] [place] [address]    RE: Martha HOWARD Her       Dear Colleague,     I had the pleasure of seeing Martha Hubbard in the [facility].    HEART CARE ENCOUNTER NOTE      [facility]  [phone]      Assessment/Recommendations   Assessment:   Paroxysmal atrial fibrillation - on xarelto.  Seems like she was taking it inconsistently but she claims it was just refilled and she is now taking it regularly.  I encouraged her to continue to refill this and take it regularly.  2. Hypertension -on losartan  50mg daily.  Good control.  3.  Hyperlipidemia, on atorvastatin  4.  Dyspnea on exertion.  I think this is mostly deconditioning following her recent back surgery.  Nuclear MPI was normal 2 years ago.  She last had an echo about 7 years ago and I think that this is worth repeating.  If there is a suggestion of RV dysfunction then CT pulmonary angiogram should be obtained to rule out chronic pulmonary emboli.  She is at some risk for this following immobility after her back surgery especially if she was not taking her Xarelto.         Plan:   1.  Continue current medications  2.  Echocardiogram to be scheduled  3.  Plan follow-up in 1 years time           History of Present Illness/Subjective    HPI: Martha Hubbard is a 69 year old female previously followed by my colleague Dr. Gonzalez.  She has transitioned care to me following Dr. Gonzalez's departure.  This is my first visit with her.  She has a history of paroxysmal atrial fibrillation and hypertension.  She has been managed with Xarelto and losartan and was on prn metoprolol for tachycardia, which she has not needed.  She feels well in general but says she has become much more short of breath going up stairs since she had her back surgery.  She still uses a cane to walk and is much less mobile than she was a year ago.  She has been putting on some weight because of her  inactivity.  She has not had any tachypalpitations.    Studies reviewed:  Echo: 2017 showed normal LV function.  GXT: Nuclear MPI 2022 no inducible ischemia.    Holter 6/2/2023: Normal sinus rhythm.  No A-fib.  Few PACs.  No bradycardia.  `  Chest x-ray 3/22/2024:  Aortic calcification noted, otherwise normal.         Physical Examination  Review of Systems   /76 (BP Location: Right arm, Patient Position: Sitting, Cuff Size: Adult Large)   Pulse 74   Resp 16   Wt 83.9 kg (185 lb)   BMI 34.96 kg/m    Body mass index is 34.96 kg/m .  Wt Readings from Last 3 Encounters:   04/30/24 83.9 kg (185 lb)   04/17/23 78.5 kg (173 lb)   04/05/22 82.3 kg (181 lb 8 oz)       General Appearance:   Pleasant elderly female appears stated age. no acute distress, heavyset body habitus   ENT/Mouth: Oropharynx normal    EYES:  no scleral icterus, normal conjunctivae. No eyelid xanthelasma   Neck: No cervical lymphadenopathy  Thyroid not enlarged or nodular  No JVD   Respiratory:   lungs clear , no rales or wheezing, Normal chest wall expansion    Cardiovascular:   Regular rhythm, normal rate. Normal 1st and 2nd heart sounds.  No murmurs, no rubs or gallops;   radial pulses are full and equal   Jugular venous pressure is nonelevated   Abdomen/GI:  No tenderness, no organomegaly, no pulsatile masses. NBS.   Extremities: No cyanosis or clubbing.  No peripheral edema   Skin: No rash or lesions   Heme/lymph/ Immunology No lymphadenopathy, petechiae   Neurologic: Alert and oriented. No focal motor weakness, gait appears normal.       Psychiatric: Pleasant, calm, appropriate affect.          No known hepatic, renal, pulmonary, or CNS disorders. The remainder of the complete ROS is negative except as noted above.         Medical History  Surgical History Family History Social History   Past Medical History:   Diagnosis Date    Allergic rhinitis     Atrial fibrillation (H)     Claudication in peripheral vascular disease (H24)      Diabetes mellitus (H)     Headache     Hip pain     Hyperlipidemia     Hypertension     Muscle weakness     Myalgia     Numbness of hand     Obesity     Osteoarthritis     Spinal stenosis     lumbar    Vitamin D deficiency      Past Surgical History:   Procedure Laterality Date    FUSION TRANSFORAMINAL LUMBAR INTERBODY, 1 LEVEL, POSTERIOR APPROACH, USING OTS SURG IMAGING GUIDANCE Right 4/5/2022    Procedure: RIGHT LUMBAR 4-5 TRANSFORAMINAL LUMBAR INTERBODY FUSION WITH LAMINECTOMY USING STEALTH NAVIGATION;  Surgeon: Brandon James MD;  Location: [facility] Main OR     No family history on file.     Social History     Socioeconomic History    Marital status:      Spouse name: Not on file    Number of children: Not on file    Years of education: Not on file    Highest education level: Not on file   Occupational History    Not on file   Tobacco Use    Smoking status: Never    Smokeless tobacco: Never   Substance and Sexual Activity    Alcohol use: No    Drug use: No    Sexual activity: Not on file   Other Topics Concern    Not on file   Social History Narrative    Not on file     Social Determinants of Health     Financial Resource Strain: Not on file   Food Insecurity: Not on file   Transportation Needs: Not on file   Physical Activity: Not on file   Stress: Not on file   Social Connections: Not on file   Interpersonal Safety: Not on file   Housing Stability: Not on file           Medications  Allergies   Current Outpatient Medications   Medication Sig Dispense Refill    atorvastatin (LIPITOR) 40 MG tablet Take 40 mg by mouth At Bedtime      Calcium Carbonate-Vitamin D 500-50 MG-UNIT CAPS Take 1 capsule by mouth daily      ketotifen (ZADITOR) 0.025 % ophthalmic solution Place 1 drop into both eyes 2 times daily as needed       losartan (COZAAR) 50 MG tablet TAKE 1 TABLET(50 MG) BY MOUTH DAILY 90 tablet 3    omeprazole (PRILOSEC) 40 MG DR capsule Take 40 mg by mouth daily      polyvinyl alcohol  "(LIQUIFILM TEARS) 1.4 % ophthalmic solution Place 1 drop into both eyes as needed for dry eyes      rivaroxaban ANTICOAGULANT (XARELTO) 20 MG TABS tablet Take 1 tablet (20 mg) by mouth daily 30 tablet 0    vitamin D3 (CHOLECALCIFEROL) 50 mcg (2000 units) tablet Take 50 mcg by mouth daily          Allergies   Allergen Reactions    Metoclopramide Dizziness    Gabapentin Rash          Lab Results    Chemistry/lipid CBC Cardiac Enzymes/BNP/TSH/INR   Recent Labs   Lab Test 05/17/23  0833   CHOL 193   HDL 82   LDL 90   TRIG 103     Recent Labs   Lab Test 05/17/23  0833 03/20/17  0949   LDL 90 84     Recent Labs   Lab Test 05/17/23  0833      POTASSIUM 3.9   CHLORIDE 107   CO2 26   GLC 95   BUN 12.7   CR 0.77   GFRESTIMATED 84   ROBBIN 10.7*     Recent Labs   Lab Test 05/17/23  0833 04/06/22  0606 04/05/22  1034   CR 0.77 0.88 0.88     Recent Labs   Lab Test 04/05/22  1034   A1C 6.0*          Recent Labs   Lab Test 04/06/22  0606   WBC 15.7*   HGB 12.3   HCT 37.2   MCV 94        Recent Labs   Lab Test 04/06/22  0606 04/05/22  1034 09/16/18  2118   HGB 12.3 13.3 14.9    No results for input(s): \"TROPONINI\" in the last 51122 hours.  No results for input(s): \"BNP\", \"NTBNPI\", \"NTBNP\" in the last 48949 hours.  No results for input(s): \"TSH\" in the last 48334 hours.  No results for input(s): \"INR\" in the last 67708 hours.     Gilbert Mccarthy MD                  Thank you for allowing me to participate in the care of your patient.      Sincerely,     Gilbert Mccarthy MD     [facility]  cc:   Nia Gonzalez MD  [address]  [place] [address]      "

## 2024-05-16 ENCOUNTER — HOSPITAL ENCOUNTER (OUTPATIENT)
Dept: CARDIOLOGY | Facility: HOSPITAL | Age: 69
Discharge: HOME | End: 2024-05-16
Attending: INTERNAL MEDICINE | Admitting: INTERNAL MEDICINE
Payer: COMMERCIAL

## 2024-05-16 DIAGNOSIS — R06.09 DOE (DYSPNEA ON EXERTION): ICD-10-CM

## 2024-05-16 LAB — LVEF ECHO: NORMAL

## 2024-05-16 PROCEDURE — 93306 TTE W/DOPPLER COMPLETE: CPT | Mod: 26 | Performed by: INTERNAL MEDICINE

## 2024-05-16 PROCEDURE — 93306 TTE W/DOPPLER COMPLETE: CPT

## 2024-12-19 ENCOUNTER — PATIENT OUTREACH (OUTPATIENT)
Dept: CARE COORDINATION | Facility: CLINIC | Age: 69
End: 2024-12-19
Payer: COMMERCIAL

## 2025-01-24 ENCOUNTER — ANCILLARY PROCEDURE (OUTPATIENT)
Dept: GENERAL RADIOLOGY | Facility: CLINIC | Age: 70
End: 2025-01-24
Attending: FAMILY MEDICINE
Payer: COMMERCIAL

## 2025-01-24 DIAGNOSIS — Z22.7 LATENT TUBERCULOSIS: ICD-10-CM

## 2025-01-24 PROCEDURE — 71046 X-RAY EXAM CHEST 2 VIEWS: CPT | Mod: GC | Performed by: RADIOLOGY

## 2025-02-13 ENCOUNTER — ANCILLARY PROCEDURE (OUTPATIENT)
Dept: CT IMAGING | Facility: CLINIC | Age: 70
End: 2025-02-13
Attending: FAMILY MEDICINE
Payer: COMMERCIAL

## 2025-02-13 DIAGNOSIS — R91.1 SOLITARY LUNG NODULE: ICD-10-CM

## 2025-02-13 PROCEDURE — 71250 CT THORAX DX C-: CPT | Performed by: RADIOLOGY

## 2025-03-28 PROBLEM — Z22.7 LATENT TUBERCULOSIS: Status: ACTIVE | Noted: 2025-01-14

## 2025-04-07 ENCOUNTER — OFFICE VISIT (OUTPATIENT)
Dept: URGENT CARE | Facility: URGENT CARE | Age: 70
End: 2025-04-07
Payer: COMMERCIAL

## 2025-04-07 VITALS
SYSTOLIC BLOOD PRESSURE: 156 MMHG | WEIGHT: 183 LBS | DIASTOLIC BLOOD PRESSURE: 100 MMHG | RESPIRATION RATE: 18 BRPM | BODY MASS INDEX: 34.58 KG/M2 | TEMPERATURE: 98.6 F | HEART RATE: 72 BPM | OXYGEN SATURATION: 97 %

## 2025-04-07 DIAGNOSIS — R53.81 MALAISE AND FATIGUE: Primary | ICD-10-CM

## 2025-04-07 DIAGNOSIS — Z22.7 LATENT TUBERCULOSIS: ICD-10-CM

## 2025-04-07 DIAGNOSIS — R53.83 MALAISE AND FATIGUE: Primary | ICD-10-CM

## 2025-04-07 PROCEDURE — 99214 OFFICE O/P EST MOD 30 MIN: CPT | Performed by: PHYSICIAN ASSISTANT

## 2025-04-07 PROCEDURE — 3080F DIAST BP >= 90 MM HG: CPT | Performed by: PHYSICIAN ASSISTANT

## 2025-04-07 PROCEDURE — 3077F SYST BP >= 140 MM HG: CPT | Performed by: PHYSICIAN ASSISTANT

## 2025-04-08 NOTE — PATIENT INSTRUCTIONS
Fatigue, exhausted, subjective fever with chills/sweats, ankles swollen  Not SOB but decreased activity tolerance  Concern for CHF  Needs further evaluation- go to emergency room now    The 2 closest emergency rooms are:  [facility] in [facility] in [place]

## 2025-04-08 NOTE — PROGRESS NOTES
Urgent Care Clinic Visit    Chief Complaint   Patient presents with    Urgent Care    URI     Per patient symptoms started on Saturday symptoms fever, chills, weakness, headaches, dizziness, body aches, and decreased appetite               4/7/2025     7:17 PM   Additional Questions   Roomed by ANTHONY Kaur   Accompanied by Family member

## 2025-04-08 NOTE — PROGRESS NOTES
Assessment & Plan     Malaise and fatigue      Latent tuberculosis  Recently diagnosed, 2 weeks ago, started on Levaquin but has not taken it.    Generally feeling unwell for 1 year, acutely unwell for 2 days. Subjective fever significant fatigue and weakness. Decreased exercise tolerance. Ankles swelling, blood pressure elevated. No URI symptoms including no sore throat cough nasal congestion. Needs further workup- go to emergency room now.    32 minutes spent on the date of the encounter doing chart review, patient visit, and documentation     Return today (on 4/7/2025) for go to the ER now.     Promise Morales PA-C  [facility]    Subjective   Martha HOWARD Her is a 70 year old who presents for the following health issues     Patient presents with:  Urgent Care  URI: Per patient symptoms started on Saturday symptoms fever, chills, weakness, headaches, dizziness, body aches, and decreased appetite       ARMOND Thomas presents for evaluation of URI symptoms   present via tablet  She was diagnosed with latent TB 3/24/25, prescribed Levaquin but has not taken it  Has surgery scheduled for April 23- cataract and was told not to take the medication  Has been feeling unwell about 1 year  Symptoms worsened 2 days ago  Subjective fever, chills, sweats, dizziness, body aches, decreased appetite and headache  Swollen ankles, decresaed exercise tolerance  Denies chest pain, cough nasal congestion, sore throat  Finished amoxicillin for erysipelas 1 week ago      Review of Systems   ROS negative except as stated above.      Objective    BP (!) 156/100   Pulse 72   Temp 98.6  F (37  C) (Tympanic)   Resp 18   Wt 83 kg (183 lb)   SpO2 97%   BMI 34.58 kg/m    Physical Exam   GENERAL: alert and no distress  EYES: Eyes grossly normal to inspection, PERRL and conjunctivae and sclerae normal  HENT: ear canals and TM's normal, nose and mouth without ulcers or lesions  NECK: no adenopathy, no asymmetry,  masses, or scars  RESP: lungs clear to auscultation - no rales, rhonchi or wheezes  CV: regular rate and rhythm, normal S1 S2, no S3 or S4, no murmur, click or rub, minimal lower extremity peripheral edema    No results found for any visits on 04/07/25.

## 2025-04-15 DIAGNOSIS — Z22.7 LTBI (LATENT TUBERCULOSIS INFECTION): Primary | ICD-10-CM

## 2025-04-21 ENCOUNTER — HOSPITAL ENCOUNTER (OUTPATIENT)
Dept: CARDIOLOGY | Facility: HOSPITAL | Age: 70
Discharge: HOME | End: 2025-04-21
Attending: NURSE PRACTITIONER | Admitting: NURSE PRACTITIONER
Payer: COMMERCIAL

## 2025-04-21 ENCOUNTER — TRANSCRIBE ORDERS (OUTPATIENT)
Dept: CARDIOLOGY | Facility: HOSPITAL | Age: 70
End: 2025-04-21
Payer: COMMERCIAL

## 2025-04-21 DIAGNOSIS — Z22.7 LTBI (LATENT TUBERCULOSIS INFECTION): Primary | ICD-10-CM

## 2025-04-21 DIAGNOSIS — Z22.7 LTBI (LATENT TUBERCULOSIS INFECTION): ICD-10-CM

## 2025-04-21 LAB
ATRIAL RATE - MUSE: 64 BPM
DIASTOLIC BLOOD PRESSURE - MUSE: NORMAL MMHG
INTERPRETATION ECG - MUSE: NORMAL
P AXIS - MUSE: 9 DEGREES
PR INTERVAL - MUSE: 192 MS
QRS DURATION - MUSE: 86 MS
QT - MUSE: 360 MS
QTC - MUSE: 371 MS
R AXIS - MUSE: 9 DEGREES
SYSTOLIC BLOOD PRESSURE - MUSE: NORMAL MMHG
T AXIS - MUSE: 13 DEGREES
VENTRICULAR RATE- MUSE: 64 BPM

## 2025-04-21 PROCEDURE — 93005 ELECTROCARDIOGRAM TRACING: CPT

## 2025-04-21 PROCEDURE — 93010 ELECTROCARDIOGRAM REPORT: CPT | Performed by: INTERNAL MEDICINE

## (undated) DEVICE — SOL WATER IRRIG 1000ML BOTTLE 2F7114

## (undated) DEVICE — MARKER 4 SPHERE PASSIVE NDI 8801074

## (undated) DEVICE — BLADE BONE MILL STRK 3.2MM FINE 5400-702-000

## (undated) DEVICE — ADH SKIN CLOSURE PREMIERPRO EXOFIN 1.0ML 3470

## (undated) DEVICE — GOWN IMPERVIOUS BREATHABLE SMART XLG 89045

## (undated) DEVICE — SUTURE VICRYL+ 2-0 CT-2 27" UND VCP269H

## (undated) DEVICE — ESU PENCIL SMOKE EVAC W/ROCKER SWITCH 0703-047-000

## (undated) DEVICE — ESU ELEC BLADE 2.75" COATED/INSULATED E1455

## (undated) DEVICE — PLATE GROUNDING ADULT W/CORD 9165L

## (undated) DEVICE — SYR BULB IRRIG DOVER 60 ML LATEX FREE 67000

## (undated) DEVICE — CATH TRAY FOLEY SURESTEP 16FR DRAIN BAG STATOCK A899916

## (undated) DEVICE — DRSG PRIMAPORE 03 1/8X6" 66000318

## (undated) DEVICE — SPONGE NEURO 1/2X1/2 WECK 200100

## (undated) DEVICE — GLOVE UNDER INDICATOR PI SZ 6.5 LF 41665

## (undated) DEVICE — Device

## (undated) DEVICE — GLOVE BIOGEL PI SZ 8.0 40880

## (undated) DEVICE — DRAPE STERI 23X17 NON STERILE 1010NSD

## (undated) DEVICE — POSITIONER ARM CRADLE LAMINECTOMY DISP

## (undated) DEVICE — RX SURGIFLO HEMOSTATIC MATRIX W/THROMBIN 8ML 2994

## (undated) DEVICE — SUTURE VICRYL+ 1 27IN CT-2 VLT VCP335H

## (undated) DEVICE — SU MONOCRYL 3-0 PS-2 18" UND MCP497G

## (undated) DEVICE — DRAPE O ARM TUBE 9732722

## (undated) DEVICE — DRSG BIOPATCH GERMICIDAL SPLIT SPONGE 4MM MED 4150

## (undated) DEVICE — DECANTER VIAL 2006S

## (undated) DEVICE — SOL NACL 0.9% IRRIG 1000ML BOTTLE 2F7124

## (undated) DEVICE — CUSTOM PACK LUMBAR FUSION SNE5BLFHEA

## (undated) DEVICE — CUSHION INSERT LG PRONE VIEW JACKSON TABLE

## (undated) DEVICE — GLOVE BIOGEL PI SZ 6.5 40865

## (undated) DEVICE — WRAP LUMBAR COMPRESS COLD THERAPY 4632P

## (undated) DEVICE — GLOVE BIOGEL PI INDICATOR 8.0 LF 41680

## (undated) DEVICE — GOWN LG DISP 9515

## (undated) DEVICE — TOOL DISSECT MIDAS MR8 14CM MATCH HEAD 3MM MR8-14MH30

## (undated) DEVICE — MARKER SPHERES PASSIVE MEDT PACK 1 8801071

## (undated) DEVICE — DRESSING MEPILEX AG SILVER 4X8 395890

## (undated) DEVICE — SPONGE RAY-TEC 4X8" 7318

## (undated) DEVICE — DRAPE TOWEL IMPERVIOUS X2 7553

## (undated) RX ORDER — CEFAZOLIN SODIUM 1 G/3ML
INJECTION, POWDER, FOR SOLUTION INTRAMUSCULAR; INTRAVENOUS
Status: DISPENSED
Start: 2022-04-05

## (undated) RX ORDER — VANCOMYCIN HYDROCHLORIDE 1 G/20ML
INJECTION, POWDER, LYOPHILIZED, FOR SOLUTION INTRAVENOUS
Status: DISPENSED
Start: 2022-04-05

## (undated) RX ORDER — DEXAMETHASONE SODIUM PHOSPHATE 10 MG/ML
INJECTION INTRAMUSCULAR; INTRAVENOUS
Status: DISPENSED
Start: 2022-04-05

## (undated) RX ORDER — KETAMINE HYDROCHLORIDE 10 MG/ML
INJECTION INTRAMUSCULAR; INTRAVENOUS
Status: DISPENSED
Start: 2022-04-05

## (undated) RX ORDER — LIDOCAINE HYDROCHLORIDE AND EPINEPHRINE 10; 10 MG/ML; UG/ML
INJECTION, SOLUTION INFILTRATION; PERINEURAL
Status: DISPENSED
Start: 2022-04-05

## (undated) RX ORDER — FENTANYL CITRATE 50 UG/ML
INJECTION, SOLUTION INTRAMUSCULAR; INTRAVENOUS
Status: DISPENSED
Start: 2022-04-05